# Patient Record
Sex: FEMALE | Race: BLACK OR AFRICAN AMERICAN | NOT HISPANIC OR LATINO | Employment: FULL TIME | URBAN - METROPOLITAN AREA
[De-identification: names, ages, dates, MRNs, and addresses within clinical notes are randomized per-mention and may not be internally consistent; named-entity substitution may affect disease eponyms.]

---

## 2023-03-10 ENCOUNTER — TELEPHONE (OUTPATIENT)
Dept: PRIMARY CARE | Facility: CLINIC | Age: 53
End: 2023-03-10
Payer: COMMERCIAL

## 2023-03-14 DIAGNOSIS — E11.9 TYPE 2 DIABETES MELLITUS WITHOUT COMPLICATION, WITHOUT LONG-TERM CURRENT USE OF INSULIN (MULTI): Primary | ICD-10-CM

## 2023-03-14 RX ORDER — DULAGLUTIDE 1.5 MG/.5ML
1.5 INJECTION, SOLUTION SUBCUTANEOUS
Qty: 4 EACH | Refills: 0 | Status: SHIPPED | OUTPATIENT
Start: 2023-03-14 | End: 2023-04-07

## 2023-03-19 DIAGNOSIS — I10 ESSENTIAL (PRIMARY) HYPERTENSION: ICD-10-CM

## 2023-03-19 RX ORDER — HYDROCHLOROTHIAZIDE 25 MG/1
TABLET ORAL
Qty: 90 TABLET | Refills: 0 | Status: SHIPPED | OUTPATIENT
Start: 2023-03-19 | End: 2023-05-22 | Stop reason: ALTCHOICE

## 2023-03-21 DIAGNOSIS — I10 HYPERTENSION, UNSPECIFIED TYPE: Primary | ICD-10-CM

## 2023-03-21 RX ORDER — LOSARTAN POTASSIUM AND HYDROCHLOROTHIAZIDE 12.5; 5 MG/1; MG/1
1 TABLET ORAL DAILY
Qty: 90 TABLET | Refills: 0 | Status: SHIPPED | OUTPATIENT
Start: 2023-03-21 | End: 2023-05-22 | Stop reason: SDUPTHER

## 2023-03-21 RX ORDER — LOSARTAN POTASSIUM AND HYDROCHLOROTHIAZIDE 12.5; 5 MG/1; MG/1
1 TABLET ORAL DAILY
COMMUNITY
End: 2023-03-21 | Stop reason: SDUPTHER

## 2023-03-23 PROBLEM — R51.9 HEADACHE: Status: ACTIVE | Noted: 2023-03-23

## 2023-03-23 PROBLEM — R93.89 ABNORMAL CT OF THE CHEST: Status: ACTIVE | Noted: 2023-03-23

## 2023-03-23 PROBLEM — J30.9 ALLERGIC RHINITIS: Status: ACTIVE | Noted: 2023-03-23

## 2023-03-23 PROBLEM — I10 ESSENTIAL HYPERTENSION: Status: ACTIVE | Noted: 2023-03-23

## 2023-03-23 PROBLEM — N92.0 MENORRHAGIA WITH REGULAR CYCLE: Status: ACTIVE | Noted: 2023-03-23

## 2023-03-23 PROBLEM — M25.561 ARTHRALGIA OF RIGHT KNEE: Status: ACTIVE | Noted: 2023-03-23

## 2023-03-23 PROBLEM — T83.32XA IUD THREADS LOST: Status: ACTIVE | Noted: 2023-03-23

## 2023-03-23 PROBLEM — G43.909 HEADACHE, MIGRAINE: Status: ACTIVE | Noted: 2023-03-23

## 2023-03-23 PROBLEM — N95.1 PERIMENOPAUSAL: Status: ACTIVE | Noted: 2023-03-23

## 2023-03-23 PROBLEM — R63.5 WEIGHT GAIN: Status: ACTIVE | Noted: 2023-03-23

## 2023-03-23 PROBLEM — N39.3 URINARY, INCONTINENCE, STRESS FEMALE: Status: ACTIVE | Noted: 2023-03-23

## 2023-03-23 PROBLEM — N95.1 MENOPAUSE SYNDROME: Status: ACTIVE | Noted: 2023-03-23

## 2023-03-23 PROBLEM — E11.9 DIABETES (MULTI): Status: ACTIVE | Noted: 2023-03-23

## 2023-03-23 PROBLEM — R92.8 ABNORMAL MAMMOGRAM: Status: ACTIVE | Noted: 2023-03-23

## 2023-03-23 PROBLEM — F41.9 ANXIETY: Status: ACTIVE | Noted: 2023-03-23

## 2023-03-23 PROBLEM — N39.0 RECURRENT URINARY TRACT INFECTION: Status: ACTIVE | Noted: 2023-03-23

## 2023-03-23 PROBLEM — R42 DIZZINESS: Status: ACTIVE | Noted: 2023-03-23

## 2023-03-23 PROBLEM — H91.90 HEARING LOSS: Status: ACTIVE | Noted: 2023-03-23

## 2023-03-23 PROBLEM — J30.0 VASOMOTOR RHINITIS: Status: ACTIVE | Noted: 2023-03-23

## 2023-03-23 PROBLEM — R35.0 URINARY FREQUENCY: Status: ACTIVE | Noted: 2023-03-23

## 2023-03-23 PROBLEM — G47.33 OBSTRUCTIVE SLEEP APNEA: Status: ACTIVE | Noted: 2023-03-23

## 2023-03-23 PROBLEM — E78.2 HYPERLIPIDEMIA, MIXED: Status: ACTIVE | Noted: 2023-03-23

## 2023-03-23 PROBLEM — E66.812 CLASS 2 SEVERE OBESITY WITH SERIOUS COMORBIDITY AND BODY MASS INDEX (BMI) OF 36.0 TO 36.9 IN ADULT: Status: ACTIVE | Noted: 2023-03-23

## 2023-03-23 PROBLEM — H02.401 PTOSIS, RIGHT: Status: ACTIVE | Noted: 2023-03-23

## 2023-03-23 PROBLEM — R80.9 PROTEINURIA: Status: ACTIVE | Noted: 2023-03-23

## 2023-03-23 PROBLEM — R73.01 IMPAIRED FASTING GLUCOSE: Status: ACTIVE | Noted: 2023-03-23

## 2023-03-23 PROBLEM — E66.01 CLASS 2 SEVERE OBESITY WITH SERIOUS COMORBIDITY AND BODY MASS INDEX (BMI) OF 36.0 TO 36.9 IN ADULT (MULTI): Status: ACTIVE | Noted: 2023-03-23

## 2023-03-23 PROBLEM — E55.9 VITAMIN D DEFICIENCY: Status: ACTIVE | Noted: 2023-03-23

## 2023-03-23 PROBLEM — G47.19 EXCESSIVE DAYTIME SLEEPINESS: Status: ACTIVE | Noted: 2023-03-23

## 2023-03-23 PROBLEM — R06.81 WITNESSED APNEIC SPELLS: Status: ACTIVE | Noted: 2023-03-23

## 2023-03-23 PROBLEM — K59.09 CHRONIC CONSTIPATION: Status: ACTIVE | Noted: 2023-03-23

## 2023-03-23 PROBLEM — D21.9 FIBROIDS: Status: ACTIVE | Noted: 2023-03-23

## 2023-03-23 PROBLEM — R59.0 CERVICAL LYMPHADENOPATHY: Status: ACTIVE | Noted: 2023-03-23

## 2023-03-23 PROBLEM — R39.15 URGENCY OF URINATION: Status: ACTIVE | Noted: 2023-03-23

## 2023-03-23 PROBLEM — R01.1 HEART MURMUR: Status: ACTIVE | Noted: 2023-03-23

## 2023-03-23 PROBLEM — R63.4 WEIGHT LOSS: Status: ACTIVE | Noted: 2023-03-23

## 2023-03-23 PROBLEM — F52.0 HYPOACTIVE SEXUAL DESIRE: Status: ACTIVE | Noted: 2023-03-23

## 2023-03-23 PROBLEM — D64.9 ANEMIA: Status: ACTIVE | Noted: 2023-03-23

## 2023-03-23 RX ORDER — ESTRADIOL 0.1 MG/G
CREAM VAGINAL NIGHTLY
COMMUNITY
Start: 2022-01-25 | End: 2024-01-02 | Stop reason: SDUPTHER

## 2023-03-23 RX ORDER — METOPROLOL TARTRATE 25 MG/1
25 TABLET, FILM COATED ORAL 2 TIMES DAILY
COMMUNITY
Start: 2019-04-06 | End: 2023-05-22 | Stop reason: SDUPTHER

## 2023-03-23 RX ORDER — AZELASTINE 1 MG/ML
2 SPRAY, METERED NASAL 2 TIMES DAILY
COMMUNITY
Start: 2022-11-01

## 2023-03-23 RX ORDER — TIRZEPATIDE 10 MG/.5ML
10 INJECTION, SOLUTION SUBCUTANEOUS
COMMUNITY
End: 2023-05-22 | Stop reason: ALTCHOICE

## 2023-03-23 RX ORDER — ATORVASTATIN CALCIUM 20 MG/1
20 TABLET, FILM COATED ORAL DAILY
COMMUNITY
Start: 2018-12-28 | End: 2023-05-22 | Stop reason: SDUPTHER

## 2023-03-23 RX ORDER — AMLODIPINE BESYLATE 5 MG/1
5 TABLET ORAL DAILY
COMMUNITY
Start: 2019-04-06 | End: 2023-05-22 | Stop reason: SDUPTHER

## 2023-03-23 RX ORDER — SOLIFENACIN SUCCINATE 5 MG/1
5 TABLET, FILM COATED ORAL DAILY
COMMUNITY
Start: 2022-11-30 | End: 2023-05-22 | Stop reason: SDUPTHER

## 2023-03-23 RX ORDER — CHOLECALCIFEROL (VITAMIN D3) 25 MCG
25 TABLET ORAL DAILY
COMMUNITY
Start: 2015-08-12

## 2023-03-28 ENCOUNTER — TELEMEDICINE (OUTPATIENT)
Dept: PRIMARY CARE | Facility: CLINIC | Age: 53
End: 2023-03-28
Payer: COMMERCIAL

## 2023-03-28 DIAGNOSIS — I10 HYPERTENSION, UNSPECIFIED TYPE: Primary | ICD-10-CM

## 2023-03-28 DIAGNOSIS — E55.9 VITAMIN D DEFICIENCY: ICD-10-CM

## 2023-03-28 DIAGNOSIS — E11.9 TYPE 2 DIABETES MELLITUS WITHOUT COMPLICATION, WITHOUT LONG-TERM CURRENT USE OF INSULIN (MULTI): ICD-10-CM

## 2023-03-28 PROCEDURE — 99213 OFFICE O/P EST LOW 20 MIN: CPT | Performed by: INTERNAL MEDICINE

## 2023-03-28 NOTE — PROGRESS NOTES
Subjective   Patient ID: Javier Rascon is a 53 y.o. female who presents for Follow-up.  HPI    Review of Systems    Objective   Physical Exam  An interactive audio and video telecommunication system which permits real time communications between the patient (at the originating site) and provider (at the distant site) was utilized to provide this telehealth service.    Verbal consent was requested and obtained from the patient on the day of encounter.    Assessment/Plan   Problem List Items Addressed This Visit          Endocrine/Metabolic    Diabetes (CMS/[facility])    Vitamin D deficiency     Other Visit Diagnoses       Hypertension, unspecified type    -  Primary        The patient is here for a follow up on chronic medical problems.  His medications were reviewed, pharmacy updated, notes reviewed, prior labs reviewed.     HTN.  Well controlled.  Continue with current medications.  Check BP at home daily.  Report to us if the numbers are higher than 130/80.     Diabetes Mellitus type II, under good  control.  1. Rx changes none  2. Education: Reviewed ‘ABCs’ of diabetes management (respective goals in parentheses):  A1C (<7), blood pressure (<130/80), and cholesterol (LDL <100).  3. Compliance at present is estimated to be good. Efforts to improve compliance were discussed.  4. Follow up in 3 months    By optimizing your health through good nutrition, daily exercise, stress management, low/moderate alcohol and avoidance of tobacco, sugar and processed foods, you can help to decrease your risk of cardiovascular disease and stroke and achieve a healthy weight. A diet rich in whole, plant-based foods such as vegetables, beans, seeds, nuts, whole grains, healthy fats and fruit - leads to lower body mass index, blood pressure, HbA1C, and cholesterol levels.              Carri Lemons MD

## 2023-04-07 DIAGNOSIS — E11.9 TYPE 2 DIABETES MELLITUS WITHOUT COMPLICATION, WITHOUT LONG-TERM CURRENT USE OF INSULIN (MULTI): ICD-10-CM

## 2023-04-07 RX ORDER — DULAGLUTIDE 1.5 MG/.5ML
1.5 INJECTION, SOLUTION SUBCUTANEOUS
Qty: 4 EACH | Refills: 0 | Status: SHIPPED | OUTPATIENT
Start: 2023-04-07 | End: 2023-05-22 | Stop reason: ALTCHOICE

## 2023-04-20 LAB
APPEARANCE, URINE: ABNORMAL
BILIRUBIN, URINE: NEGATIVE
BLOOD, URINE: ABNORMAL
COLOR, URINE: ABNORMAL
GLUCOSE, URINE: NEGATIVE MG/DL
KETONES, URINE: ABNORMAL MG/DL
LEUKOCYTE ESTERASE, URINE: NEGATIVE
MUCUS, URINE: NORMAL /LPF
NITRITE, URINE: NEGATIVE
PH, URINE: 5 (ref 5–8)
PROTEIN, URINE: ABNORMAL MG/DL
RBC, URINE: 1 /HPF (ref 0–5)
SPECIFIC GRAVITY, URINE: 1.03 (ref 1–1.03)
SQUAMOUS EPITHELIAL CELLS, URINE: 3 /HPF
UROBILINOGEN, URINE: <2 MG/DL (ref 0–1.9)
WBC, URINE: <1 /HPF (ref 0–5)

## 2023-04-21 LAB — URINE CULTURE: NORMAL

## 2023-05-22 ENCOUNTER — TELEMEDICINE (OUTPATIENT)
Dept: PRIMARY CARE | Facility: CLINIC | Age: 53
End: 2023-05-22
Payer: COMMERCIAL

## 2023-05-22 DIAGNOSIS — E55.9 VITAMIN D DEFICIENCY: ICD-10-CM

## 2023-05-22 DIAGNOSIS — G47.19 EXCESSIVE DAYTIME SLEEPINESS: Primary | ICD-10-CM

## 2023-05-22 DIAGNOSIS — I10 ESSENTIAL HYPERTENSION: ICD-10-CM

## 2023-05-22 DIAGNOSIS — I10 HYPERTENSION, UNSPECIFIED TYPE: ICD-10-CM

## 2023-05-22 DIAGNOSIS — E78.2 HYPERLIPIDEMIA, MIXED: ICD-10-CM

## 2023-05-22 DIAGNOSIS — E11.9 TYPE 2 DIABETES MELLITUS WITHOUT COMPLICATION, WITHOUT LONG-TERM CURRENT USE OF INSULIN (MULTI): ICD-10-CM

## 2023-05-22 DIAGNOSIS — G47.33 OBSTRUCTIVE SLEEP APNEA: ICD-10-CM

## 2023-05-22 PROCEDURE — 99214 OFFICE O/P EST MOD 30 MIN: CPT | Performed by: INTERNAL MEDICINE

## 2023-05-22 RX ORDER — METOPROLOL TARTRATE 25 MG/1
TABLET, FILM COATED ORAL
Qty: 180 TABLET | Refills: 0 | Status: SHIPPED | OUTPATIENT
Start: 2023-05-22 | End: 2023-09-08 | Stop reason: SDUPTHER

## 2023-05-22 RX ORDER — SOLIFENACIN SUCCINATE 5 MG/1
5 TABLET, FILM COATED ORAL DAILY
Qty: 90 TABLET | Refills: 0 | Status: SHIPPED | OUTPATIENT
Start: 2023-05-22 | End: 2023-08-16

## 2023-05-22 RX ORDER — METOPROLOL TARTRATE 25 MG/1
25 TABLET, FILM COATED ORAL 2 TIMES DAILY
Qty: 90 TABLET | Refills: 0 | Status: SHIPPED | OUTPATIENT
Start: 2023-05-22 | End: 2023-05-22

## 2023-05-22 RX ORDER — AMLODIPINE BESYLATE 5 MG/1
5 TABLET ORAL DAILY
Qty: 90 TABLET | Refills: 0 | Status: SHIPPED | OUTPATIENT
Start: 2023-05-22 | End: 2023-09-08 | Stop reason: SDUPTHER

## 2023-05-22 RX ORDER — LOSARTAN POTASSIUM AND HYDROCHLOROTHIAZIDE 12.5; 5 MG/1; MG/1
1 TABLET ORAL DAILY
Qty: 90 TABLET | Refills: 0 | Status: SHIPPED | OUTPATIENT
Start: 2023-05-22 | End: 2023-09-08 | Stop reason: SDUPTHER

## 2023-05-22 RX ORDER — ATORVASTATIN CALCIUM 20 MG/1
20 TABLET, FILM COATED ORAL DAILY
Qty: 90 TABLET | Refills: 0 | Status: SHIPPED | OUTPATIENT
Start: 2023-05-22 | End: 2023-09-08 | Stop reason: SDUPTHER

## 2023-05-22 ASSESSMENT — ENCOUNTER SYMPTOMS
RESPIRATORY NEGATIVE: 1
CARDIOVASCULAR NEGATIVE: 1
CONSTITUTIONAL NEGATIVE: 1
GASTROINTESTINAL NEGATIVE: 1

## 2023-05-22 NOTE — PROGRESS NOTES
Subjective   Patient ID: Javier Rascon is a 53 y.o. female who presents for Follow-up.  HPI    Review of Systems   Constitutional: Negative.    Respiratory: Negative.     Cardiovascular: Negative.    Gastrointestinal: Negative.        Objective   Physical Exam  Neurological:      Mental Status: She is alert.   Psychiatric:         Mood and Affect: Mood normal.         Assessment/Plan   Problem List Items Addressed This Visit          Nervous    Excessive daytime sleepiness - Primary    Obstructive sleep apnea       Circulatory    Essential hypertension    Relevant Medications    amLODIPine (Norvasc) 5 mg tablet    atorvastatin (Lipitor) 20 mg tablet    metoprolol tartrate (Lopressor) 25 mg tablet    solifenacin (VESIcare) 5 mg tablet    dulaglutide 3 mg/0.5 mL pen injector       Endocrine/Metabolic    Diabetes (CMS/HCC)    Relevant Medications    amLODIPine (Norvasc) 5 mg tablet    atorvastatin (Lipitor) 20 mg tablet    metoprolol tartrate (Lopressor) 25 mg tablet    solifenacin (VESIcare) 5 mg tablet    dulaglutide 3 mg/0.5 mL pen injector    Other Relevant Orders    CBC    Comprehensive Metabolic Panel    TSH with reflex to Free T4 if abnormal    Hemoglobin A1C    Lipid Panel    Vitamin D, Total    Albumin, urine, random    Vitamin D deficiency    Relevant Orders    CBC    Comprehensive Metabolic Panel    TSH with reflex to Free T4 if abnormal    Hemoglobin A1C    Lipid Panel    Vitamin D, Total    Albumin, urine, random       Other    Hyperlipidemia, mixed    Relevant Medications    amLODIPine (Norvasc) 5 mg tablet    atorvastatin (Lipitor) 20 mg tablet    metoprolol tartrate (Lopressor) 25 mg tablet    solifenacin (VESIcare) 5 mg tablet    dulaglutide 3 mg/0.5 mL pen injector    Other Relevant Orders    CBC    Comprehensive Metabolic Panel    TSH with reflex to Free T4 if abnormal    Hemoglobin A1C    Lipid Panel    Vitamin D, Total    Albumin, urine, random     Other Visit Diagnoses       Hypertension,  unspecified type        Relevant Medications    losartan-hydrochlorothiazide (Hyzaar) 50-12.5 mg tablet    amLODIPine (Norvasc) 5 mg tablet    atorvastatin (Lipitor) 20 mg tablet    metoprolol tartrate (Lopressor) 25 mg tablet    solifenacin (VESIcare) 5 mg tablet    dulaglutide 3 mg/0.5 mL pen injector    Other Relevant Orders    CBC    Comprehensive Metabolic Panel    TSH with reflex to Free T4 if abnormal    Hemoglobin A1C    Lipid Panel    Vitamin D, Total    Albumin, urine, random        An interactive audio and video telecommunication system which permits real time communications between the patient (at the originating site) and provider (at the distant site) was utilized to provide this telehealth service.    Verbal consent was requested and obtained from the patient on the day of encounter.  The patient is here for a follow up on chronic medical problems.  His medications were reviewed, pharmacy updated, notes reviewed, prior labs reviewed.       HTN.  Well controlled.  Continue with current medications.  Check BP at home daily.  Report to us if the numbers are higher than 130/80.     Diabetes Mellitus type II, under good  control.  1. Rx changes none  2. Education: Reviewed ‘ABCs’ of diabetes management (respective goals in parentheses):  A1C (<7), blood pressure (<130/80), and cholesterol (LDL <100).  3. Compliance at present is estimated to be good. Efforts to improve compliance were discussed.  4. Follow up in 3 months    We will increase Trulicity to 3 mg weekly  Mounjaro didn't work well, she was hungry and BG levels were off  She went back on Trulicity        HLD  Stable  Continue with statins  Check lipids    Lab Results   Component Value Date    WBC 6.4 03/05/2023    HGB 12.8 03/05/2023    HCT 38.8 03/05/2023     03/05/2023    CHOL 173 11/30/2022    TRIG 118 11/30/2022    HDL 40.9 11/30/2022    ALT 95 (H) 03/05/2023     (H) 03/05/2023     03/05/2023    K 3.3 (L) 03/05/2023    CL  100 03/05/2023    CREATININE 0.64 03/05/2023    BUN 9 03/05/2023    CO2 30 03/05/2023    TSH 0.66 11/30/2022    HGBA1C 7.3 (A) 11/30/2022     par  MDM  1) COMPLEXITY: MORE THAN 1 STABLE CHRONIC CONDITION ADDRESSED OR 1 ACUTE ILLNESS ADDRESSED   2)DATA: TESTS INTERPRETED AND OR ORDERED, TOOK INDEPENDENT HISTORY OR RECORDS REVIEWED  3)RISK: MODERATE RISK DUE TO NATURE OF MEDICAL CONDITIONS/COMORBIDITY OR MEDICATIONS ORDERED OR SURGICAL OR PROCEDURE REFERRAL           Carri Lemons MD

## 2023-06-05 DIAGNOSIS — J30.9 ALLERGIC RHINITIS, UNSPECIFIED SEASONALITY, UNSPECIFIED TRIGGER: ICD-10-CM

## 2023-06-05 RX ORDER — LEVOCETIRIZINE DIHYDROCHLORIDE 5 MG/1
5 TABLET, FILM COATED ORAL EVERY EVENING
Qty: 90 TABLET | Refills: 0 | Status: SHIPPED | OUTPATIENT
Start: 2023-06-05 | End: 2023-09-13

## 2023-06-23 ENCOUNTER — TELEPHONE (OUTPATIENT)
Dept: PRIMARY CARE | Facility: CLINIC | Age: 53
End: 2023-06-23
Payer: COMMERCIAL

## 2023-08-16 DIAGNOSIS — I10 HYPERTENSION, UNSPECIFIED TYPE: ICD-10-CM

## 2023-08-16 DIAGNOSIS — I10 ESSENTIAL HYPERTENSION: ICD-10-CM

## 2023-08-16 DIAGNOSIS — E78.2 HYPERLIPIDEMIA, MIXED: ICD-10-CM

## 2023-08-16 DIAGNOSIS — E11.9 TYPE 2 DIABETES MELLITUS WITHOUT COMPLICATION, WITHOUT LONG-TERM CURRENT USE OF INSULIN (MULTI): ICD-10-CM

## 2023-08-16 RX ORDER — SOLIFENACIN SUCCINATE 5 MG/1
5 TABLET, FILM COATED ORAL DAILY
Qty: 90 TABLET | Refills: 0 | Status: SHIPPED | OUTPATIENT
Start: 2023-08-16 | End: 2024-04-01 | Stop reason: SDUPTHER

## 2023-08-23 ENCOUNTER — APPOINTMENT (OUTPATIENT)
Dept: PRIMARY CARE | Facility: CLINIC | Age: 53
End: 2023-08-23

## 2023-09-08 ENCOUNTER — TELEMEDICINE (OUTPATIENT)
Dept: PRIMARY CARE | Facility: CLINIC | Age: 53
End: 2023-09-08
Payer: MEDICARE

## 2023-09-08 DIAGNOSIS — I10 HYPERTENSION, UNSPECIFIED TYPE: ICD-10-CM

## 2023-09-08 DIAGNOSIS — E11.9 TYPE 2 DIABETES MELLITUS WITHOUT COMPLICATION, WITHOUT LONG-TERM CURRENT USE OF INSULIN (MULTI): Primary | ICD-10-CM

## 2023-09-08 DIAGNOSIS — I10 ESSENTIAL HYPERTENSION: ICD-10-CM

## 2023-09-08 DIAGNOSIS — E66.01 CLASS 2 SEVERE OBESITY DUE TO EXCESS CALORIES WITH SERIOUS COMORBIDITY AND BODY MASS INDEX (BMI) OF 36.0 TO 36.9 IN ADULT (MULTI): ICD-10-CM

## 2023-09-08 DIAGNOSIS — E78.2 HYPERLIPIDEMIA, MIXED: ICD-10-CM

## 2023-09-08 DIAGNOSIS — E11.9 TYPE 2 DIABETES MELLITUS WITHOUT COMPLICATION, WITHOUT LONG-TERM CURRENT USE OF INSULIN (MULTI): ICD-10-CM

## 2023-09-08 PROCEDURE — 99214 OFFICE O/P EST MOD 30 MIN: CPT | Performed by: INTERNAL MEDICINE

## 2023-09-08 RX ORDER — LOSARTAN POTASSIUM AND HYDROCHLOROTHIAZIDE 12.5; 5 MG/1; MG/1
1 TABLET ORAL DAILY
Qty: 90 TABLET | Refills: 0 | Status: SHIPPED | OUTPATIENT
Start: 2023-09-08 | End: 2024-01-02 | Stop reason: SDUPTHER

## 2023-09-08 RX ORDER — TIRZEPATIDE 5 MG/.5ML
5 INJECTION, SOLUTION SUBCUTANEOUS
Qty: 2 ML | Refills: 2 | Status: SHIPPED | OUTPATIENT
Start: 2023-09-08 | End: 2023-09-08 | Stop reason: SDUPTHER

## 2023-09-08 RX ORDER — ATORVASTATIN CALCIUM 20 MG/1
20 TABLET, FILM COATED ORAL DAILY
Qty: 90 TABLET | Refills: 0 | Status: SHIPPED | OUTPATIENT
Start: 2023-09-08 | End: 2024-01-02 | Stop reason: SDUPTHER

## 2023-09-08 RX ORDER — TIRZEPATIDE 2.5 MG/.5ML
2.5 INJECTION, SOLUTION SUBCUTANEOUS
Qty: 2 ML | Refills: 0 | Status: SHIPPED | OUTPATIENT
Start: 2023-09-08 | End: 2023-09-08 | Stop reason: SDUPTHER

## 2023-09-08 RX ORDER — METOPROLOL TARTRATE 25 MG/1
25 TABLET, FILM COATED ORAL 2 TIMES DAILY
Qty: 180 TABLET | Refills: 0 | Status: SHIPPED | OUTPATIENT
Start: 2023-09-08 | End: 2024-01-02 | Stop reason: SDUPTHER

## 2023-09-08 RX ORDER — AMLODIPINE BESYLATE 5 MG/1
5 TABLET ORAL DAILY
Qty: 90 TABLET | Refills: 0 | Status: SHIPPED | OUTPATIENT
Start: 2023-09-08 | End: 2024-01-02 | Stop reason: SDUPTHER

## 2023-09-08 ASSESSMENT — ENCOUNTER SYMPTOMS
RESPIRATORY NEGATIVE: 1
GASTROINTESTINAL NEGATIVE: 1
CONSTITUTIONAL NEGATIVE: 1
CARDIOVASCULAR NEGATIVE: 1

## 2023-09-08 NOTE — PROGRESS NOTES
Subjective   Patient ID: Javier Rascon is a 53 y.o. female who presents for Follow-up.  HPI    Review of Systems   Constitutional: Negative.    Respiratory: Negative.     Cardiovascular: Negative.    Gastrointestinal: Negative.        Objective   Physical Exam  Neurological:      Mental Status: She is alert.   Psychiatric:         Mood and Affect: Mood normal.         Assessment/Plan   Problem List Items Addressed This Visit       Class 2 severe obesity with serious comorbidity and body mass index (BMI) of 36.0 to 36.9 in adult (CMS/HCC)    Diabetes (CMS/HCC) - Primary    Relevant Medications    tirzepatide (Mounjaro) 2.5 mg/0.5 mL pen injector    tirzepatide (Mounjaro) 5 mg/0.5 mL pen injector    amLODIPine (Norvasc) 5 mg tablet    atorvastatin (Lipitor) 20 mg tablet    metoprolol tartrate (Lopressor) 25 mg tablet    Essential hypertension    Relevant Medications    amLODIPine (Norvasc) 5 mg tablet    atorvastatin (Lipitor) 20 mg tablet    metoprolol tartrate (Lopressor) 25 mg tablet    Hyperlipidemia, mixed    Relevant Medications    amLODIPine (Norvasc) 5 mg tablet    atorvastatin (Lipitor) 20 mg tablet    metoprolol tartrate (Lopressor) 25 mg tablet     Other Visit Diagnoses       Hypertension, unspecified type        Relevant Medications    amLODIPine (Norvasc) 5 mg tablet    atorvastatin (Lipitor) 20 mg tablet    metoprolol tartrate (Lopressor) 25 mg tablet    losartan-hydrochlorothiazide (Hyzaar) 50-12.5 mg tablet          Stopped taking Tulicity 2 months ago.  Would like to go back on Mounjaro, it was curbing her appetite better.  We will send the script.  Due for a physical.  Due for complete BW at that time.  BP has been stable.  Lab Results   Component Value Date    WBC 7.3 07/23/2023    HGB 12.9 07/23/2023    HCT 39.6 07/23/2023     07/23/2023    CHOL 173 11/30/2022    TRIG 118 11/30/2022    HDL 40.9 11/30/2022    ALT 95 (H) 03/05/2023     (H) 03/05/2023     07/23/2023    K 3.3 (L)  "07/23/2023    CL 98 07/23/2023    CREATININE 0.77 07/23/2023    BUN 15 07/23/2023    CO2 34 (H) 07/23/2023    TSH 0.66 11/30/2022    HGBA1C 7.3 (A) 11/30/2022     Par  Lab Results   Component Value Date    CHOL 173 11/30/2022    CHOL 147 05/06/2022    CHOL 163 08/11/2021     Lab Results   Component Value Date    HDL 40.9 11/30/2022    HDL 41.4 05/06/2022    HDL 40.9 08/11/2021     No results found for: \"LDLCALC\"  Lab Results   Component Value Date    TRIG 118 11/30/2022    TRIG 58 05/06/2022    TRIG 93 08/11/2021     No components found for: \"CHOLHDL\"  Lab Results   Component Value Date    HGBA1C 7.3 (A) 11/30/2022     .   MDM  1) COMPLEXITY: MORE THAN 1 STABLE CHRONIC CONDITION ADDRESSED OR 1 ACUTE ILLNESS ADDRESSED   2)DATA: TESTS INTERPRETED AND OR ORDERED, TOOK INDEPENDENT HISTORY OR RECORDS REVIEWED  3)RISK: MODERATE RISK DUE TO NATURE OF MEDICAL CONDITIONS/COMORBIDITY OR MEDICATIONS ORDERED OR SURGICAL OR PROCEDURE REFERRAL    An interactive audio and video telecommunication system which permits real time communications between the patient (at the originating site) and provider (at the distant site) was utilized to provide this telehealth service.    Verbal consent was requested and obtained from the patient on the day of encounter.        Carri Lemons MD   "

## 2023-09-09 RX ORDER — TIRZEPATIDE 5 MG/.5ML
5 INJECTION, SOLUTION SUBCUTANEOUS
Qty: 2 ML | Refills: 2 | Status: SHIPPED | OUTPATIENT
Start: 2023-09-09 | End: 2024-01-02 | Stop reason: ALTCHOICE

## 2023-09-09 RX ORDER — TIRZEPATIDE 2.5 MG/.5ML
2.5 INJECTION, SOLUTION SUBCUTANEOUS
Qty: 2 ML | Refills: 0 | Status: SHIPPED | OUTPATIENT
Start: 2023-09-09 | End: 2024-01-02 | Stop reason: ALTCHOICE

## 2023-09-13 DIAGNOSIS — J30.9 ALLERGIC RHINITIS, UNSPECIFIED SEASONALITY, UNSPECIFIED TRIGGER: ICD-10-CM

## 2023-09-13 RX ORDER — LEVOCETIRIZINE DIHYDROCHLORIDE 5 MG/1
5 TABLET, FILM COATED ORAL EVERY EVENING
Qty: 90 TABLET | Refills: 0 | Status: SHIPPED | OUTPATIENT
Start: 2023-09-13

## 2023-09-14 RX ORDER — CEPHALEXIN 250 MG/1
250 CAPSULE ORAL
COMMUNITY
Start: 2023-05-23 | End: 2024-05-23

## 2023-10-26 ENCOUNTER — APPOINTMENT (OUTPATIENT)
Dept: UROLOGY | Facility: CLINIC | Age: 53
End: 2023-10-26
Payer: MEDICARE

## 2023-11-03 ENCOUNTER — APPOINTMENT (OUTPATIENT)
Dept: PRIMARY CARE | Facility: CLINIC | Age: 53
End: 2023-11-03
Payer: MEDICARE

## 2023-12-08 ENCOUNTER — APPOINTMENT (OUTPATIENT)
Dept: PRIMARY CARE | Facility: CLINIC | Age: 53
End: 2023-12-08
Payer: MEDICARE

## 2024-01-02 ENCOUNTER — OFFICE VISIT (OUTPATIENT)
Dept: PRIMARY CARE | Facility: CLINIC | Age: 54
End: 2024-01-02
Payer: MEDICARE

## 2024-01-02 ENCOUNTER — LAB (OUTPATIENT)
Dept: LAB | Facility: LAB | Age: 54
End: 2024-01-02
Payer: MEDICARE

## 2024-01-02 VITALS — WEIGHT: 218 LBS | SYSTOLIC BLOOD PRESSURE: 110 MMHG | DIASTOLIC BLOOD PRESSURE: 80 MMHG | BODY MASS INDEX: 37.42 KG/M2

## 2024-01-02 DIAGNOSIS — E78.2 HYPERLIPIDEMIA, MIXED: ICD-10-CM

## 2024-01-02 DIAGNOSIS — Z00.00 ANNUAL PHYSICAL EXAM: ICD-10-CM

## 2024-01-02 DIAGNOSIS — I10 HYPERTENSION, UNSPECIFIED TYPE: ICD-10-CM

## 2024-01-02 DIAGNOSIS — I10 ESSENTIAL HYPERTENSION: ICD-10-CM

## 2024-01-02 DIAGNOSIS — E11.9 TYPE 2 DIABETES MELLITUS WITHOUT COMPLICATION, WITHOUT LONG-TERM CURRENT USE OF INSULIN (MULTI): ICD-10-CM

## 2024-01-02 DIAGNOSIS — Z00.00 ANNUAL PHYSICAL EXAM: Primary | ICD-10-CM

## 2024-01-02 LAB
25(OH)D3 SERPL-MCNC: 78 NG/ML (ref 30–100)
ALBUMIN SERPL BCP-MCNC: 4.8 G/DL (ref 3.4–5)
ALP SERPL-CCNC: 128 U/L (ref 33–110)
ALT SERPL W P-5'-P-CCNC: 25 U/L (ref 7–45)
ANION GAP SERPL CALC-SCNC: 13 MMOL/L (ref 10–20)
AST SERPL W P-5'-P-CCNC: 21 U/L (ref 9–39)
BILIRUB SERPL-MCNC: 1.2 MG/DL (ref 0–1.2)
BUN SERPL-MCNC: 8 MG/DL (ref 6–23)
CALCIUM SERPL-MCNC: 10.4 MG/DL (ref 8.6–10.6)
CHLORIDE SERPL-SCNC: 101 MMOL/L (ref 98–107)
CHOLEST SERPL-MCNC: 131 MG/DL (ref 0–199)
CHOLESTEROL/HDL RATIO: 2.9
CO2 SERPL-SCNC: 31 MMOL/L (ref 21–32)
CREAT SERPL-MCNC: 0.68 MG/DL (ref 0.5–1.05)
CREAT UR-MCNC: 189.8 MG/DL (ref 20–320)
ERYTHROCYTE [DISTWIDTH] IN BLOOD BY AUTOMATED COUNT: 14.2 % (ref 11.5–14.5)
EST. AVERAGE GLUCOSE BLD GHB EST-MCNC: 134 MG/DL
FSH SERPL-ACNC: 40.5 IU/L
GFR SERPL CREATININE-BSD FRML MDRD: >90 ML/MIN/1.73M*2
GLUCOSE SERPL-MCNC: 102 MG/DL (ref 74–99)
HBA1C MFR BLD: 6.3 %
HCT VFR BLD AUTO: 40.3 % (ref 36–46)
HCV AB SER QL: NONREACTIVE
HDLC SERPL-MCNC: 44.9 MG/DL
HGB BLD-MCNC: 13.2 G/DL (ref 12–16)
HIV 1+2 AB+HIV1 P24 AG SERPL QL IA: NONREACTIVE
LDLC SERPL CALC-MCNC: 73 MG/DL
MCH RBC QN AUTO: 28.8 PG (ref 26–34)
MCHC RBC AUTO-ENTMCNC: 32.8 G/DL (ref 32–36)
MCV RBC AUTO: 88 FL (ref 80–100)
MICROALBUMIN UR-MCNC: 11.8 MG/L
MICROALBUMIN/CREAT UR: 6.2 UG/MG CREAT
NON HDL CHOLESTEROL: 86 MG/DL (ref 0–149)
NRBC BLD-RTO: 0 /100 WBCS (ref 0–0)
PLATELET # BLD AUTO: 402 X10*3/UL (ref 150–450)
POTASSIUM SERPL-SCNC: 3.7 MMOL/L (ref 3.5–5.3)
PROT SERPL-MCNC: 7.4 G/DL (ref 6.4–8.2)
RBC # BLD AUTO: 4.59 X10*6/UL (ref 4–5.2)
SODIUM SERPL-SCNC: 141 MMOL/L (ref 136–145)
TRIGL SERPL-MCNC: 68 MG/DL (ref 0–149)
TSH SERPL-ACNC: 1.07 MIU/L (ref 0.44–3.98)
VLDL: 14 MG/DL (ref 0–40)
WBC # BLD AUTO: 5.6 X10*3/UL (ref 4.4–11.3)

## 2024-01-02 PROCEDURE — 93000 ELECTROCARDIOGRAM COMPLETE: CPT | Performed by: INTERNAL MEDICINE

## 2024-01-02 PROCEDURE — 84443 ASSAY THYROID STIM HORMONE: CPT

## 2024-01-02 PROCEDURE — 82306 VITAMIN D 25 HYDROXY: CPT

## 2024-01-02 PROCEDURE — 82043 UR ALBUMIN QUANTITATIVE: CPT

## 2024-01-02 PROCEDURE — 1036F TOBACCO NON-USER: CPT | Performed by: INTERNAL MEDICINE

## 2024-01-02 PROCEDURE — 87389 HIV-1 AG W/HIV-1&-2 AB AG IA: CPT

## 2024-01-02 PROCEDURE — 99214 OFFICE O/P EST MOD 30 MIN: CPT | Performed by: INTERNAL MEDICINE

## 2024-01-02 PROCEDURE — 83001 ASSAY OF GONADOTROPIN (FSH): CPT

## 2024-01-02 PROCEDURE — 3008F BODY MASS INDEX DOCD: CPT | Performed by: INTERNAL MEDICINE

## 2024-01-02 PROCEDURE — 36415 COLL VENOUS BLD VENIPUNCTURE: CPT

## 2024-01-02 PROCEDURE — 83036 HEMOGLOBIN GLYCOSYLATED A1C: CPT

## 2024-01-02 PROCEDURE — 3079F DIAST BP 80-89 MM HG: CPT | Performed by: INTERNAL MEDICINE

## 2024-01-02 PROCEDURE — 80061 LIPID PANEL: CPT

## 2024-01-02 PROCEDURE — 86803 HEPATITIS C AB TEST: CPT

## 2024-01-02 PROCEDURE — 80053 COMPREHEN METABOLIC PANEL: CPT

## 2024-01-02 PROCEDURE — 82570 ASSAY OF URINE CREATININE: CPT

## 2024-01-02 PROCEDURE — 3074F SYST BP LT 130 MM HG: CPT | Performed by: INTERNAL MEDICINE

## 2024-01-02 PROCEDURE — 99396 PREV VISIT EST AGE 40-64: CPT | Performed by: INTERNAL MEDICINE

## 2024-01-02 PROCEDURE — 85027 COMPLETE CBC AUTOMATED: CPT

## 2024-01-02 RX ORDER — LOSARTAN POTASSIUM AND HYDROCHLOROTHIAZIDE 12.5; 5 MG/1; MG/1
1 TABLET ORAL DAILY
Qty: 90 TABLET | Refills: 0 | Status: SHIPPED | OUTPATIENT
Start: 2024-01-02 | End: 2024-04-01 | Stop reason: SDUPTHER

## 2024-01-02 RX ORDER — ATORVASTATIN CALCIUM 20 MG/1
20 TABLET, FILM COATED ORAL DAILY
Qty: 90 TABLET | Refills: 0 | Status: SHIPPED | OUTPATIENT
Start: 2024-01-02 | End: 2024-04-01 | Stop reason: SDUPTHER

## 2024-01-02 RX ORDER — ESTRADIOL 0.1 MG/G
2 CREAM VAGINAL NIGHTLY
Qty: 42.5 G | Refills: 0 | Status: SHIPPED | OUTPATIENT
Start: 2024-01-02 | End: 2024-04-01 | Stop reason: SDUPTHER

## 2024-01-02 RX ORDER — AMLODIPINE BESYLATE 5 MG/1
5 TABLET ORAL DAILY
Qty: 90 TABLET | Refills: 0 | Status: SHIPPED | OUTPATIENT
Start: 2024-01-02 | End: 2024-04-01 | Stop reason: SDUPTHER

## 2024-01-02 RX ORDER — METOPROLOL TARTRATE 25 MG/1
25 TABLET, FILM COATED ORAL 2 TIMES DAILY
Qty: 180 TABLET | Refills: 0 | Status: SHIPPED | OUTPATIENT
Start: 2024-01-02 | End: 2024-04-01 | Stop reason: SDUPTHER

## 2024-01-02 RX ORDER — TIRZEPATIDE 7.5 MG/.5ML
7.5 INJECTION, SOLUTION SUBCUTANEOUS
Qty: 2 ML | Refills: 2 | Status: SHIPPED | OUTPATIENT
Start: 2024-01-02 | End: 2024-03-01 | Stop reason: ALTCHOICE

## 2024-01-02 ASSESSMENT — ENCOUNTER SYMPTOMS
RESPIRATORY NEGATIVE: 1
CARDIOVASCULAR NEGATIVE: 1
GASTROINTESTINAL NEGATIVE: 1
CONSTITUTIONAL NEGATIVE: 1

## 2024-01-02 ASSESSMENT — PATIENT HEALTH QUESTIONNAIRE - PHQ9
2. FEELING DOWN, DEPRESSED OR HOPELESS: NOT AT ALL
SUM OF ALL RESPONSES TO PHQ9 QUESTIONS 1 AND 2: 0
1. LITTLE INTEREST OR PLEASURE IN DOING THINGS: NOT AT ALL

## 2024-01-02 NOTE — PROGRESS NOTES
Subjective   Patient ID: Javier Rascon is a 53 y.o. female who presents for Annual Exam and Med Refill.  Med Refill        Review of Systems   Constitutional: Negative.    Respiratory: Negative.     Cardiovascular: Negative.    Gastrointestinal: Negative.        Objective   Physical Exam  Vitals and nursing note reviewed.   Constitutional:       Appearance: Normal appearance.   Cardiovascular:      Rate and Rhythm: Normal rate and regular rhythm.      Pulses: Normal pulses.      Heart sounds: Normal heart sounds.   Pulmonary:      Breath sounds: Normal breath sounds.   Abdominal:      General: Bowel sounds are normal.      Palpations: Abdomen is soft.   Musculoskeletal:         General: Normal range of motion.   Skin:     General: Skin is warm.   Neurological:      General: No focal deficit present.      Mental Status: She is alert.   Psychiatric:         Mood and Affect: Mood normal.         Behavior: Behavior normal.         Assessment/Plan   Problem List Items Addressed This Visit       Diabetes (CMS/HCC)    Relevant Medications    amLODIPine (Norvasc) 5 mg tablet    atorvastatin (Lipitor) 20 mg tablet    metoprolol tartrate (Lopressor) 25 mg tablet    tirzepatide (Mounjaro) 7.5 mg/0.5 mL pen injector    Other Relevant Orders    CBC    Comprehensive Metabolic Panel    TSH with reflex to Free T4 if abnormal    Hemoglobin A1C    Lipid Panel    Vitamin D 25-Hydroxy,Total (for eval of Vitamin D levels)    Albumin, urine, random    HIV 1/2 Antigen/Antibody Screen with Reflex to Confirmation    FSH    Hepatitis C antibody    BI mammo bilateral screening tomosynthesis    Referral to Ophthalmology    Referral to Obstetrics / Gynecology    Essential hypertension    Relevant Medications    amLODIPine (Norvasc) 5 mg tablet    atorvastatin (Lipitor) 20 mg tablet    metoprolol tartrate (Lopressor) 25 mg tablet    Other Relevant Orders    CBC    Comprehensive Metabolic Panel    TSH with reflex to Free T4 if abnormal     Hemoglobin A1C    Lipid Panel    Vitamin D 25-Hydroxy,Total (for eval of Vitamin D levels)    Albumin, urine, random    HIV 1/2 Antigen/Antibody Screen with Reflex to Confirmation    FSH    Hepatitis C antibody    BI mammo bilateral screening tomosynthesis    Referral to Ophthalmology    Referral to Obstetrics / Gynecology    Hyperlipidemia, mixed    Relevant Medications    amLODIPine (Norvasc) 5 mg tablet    atorvastatin (Lipitor) 20 mg tablet    metoprolol tartrate (Lopressor) 25 mg tablet    Other Relevant Orders    CBC    Comprehensive Metabolic Panel    TSH with reflex to Free T4 if abnormal    Hemoglobin A1C    Lipid Panel    Vitamin D 25-Hydroxy,Total (for eval of Vitamin D levels)    Albumin, urine, random    HIV 1/2 Antigen/Antibody Screen with Reflex to Confirmation    FSH    Hepatitis C antibody    BI mammo bilateral screening tomosynthesis    Referral to Ophthalmology    Referral to Obstetrics / Gynecology     Other Visit Diagnoses       Annual physical exam    -  Primary    Relevant Medications    estradiol (Estrace) 0.01 % (0.1 mg/gram) vaginal cream    Other Relevant Orders    CBC    Comprehensive Metabolic Panel    TSH with reflex to Free T4 if abnormal    Hemoglobin A1C    Lipid Panel    Vitamin D 25-Hydroxy,Total (for eval of Vitamin D levels)    Albumin, urine, random    HIV 1/2 Antigen/Antibody Screen with Reflex to Confirmation    FSH    Hepatitis C antibody    BI mammo bilateral screening tomosynthesis    Referral to Ophthalmology    Referral to Obstetrics / Gynecology    ECG 12 Lead    Hypertension, unspecified type        Relevant Medications    amLODIPine (Norvasc) 5 mg tablet    atorvastatin (Lipitor) 20 mg tablet    losartan-hydrochlorothiazide (Hyzaar) 50-12.5 mg tablet    metoprolol tartrate (Lopressor) 25 mg tablet          The patient is here for a physical.  Exam is normal.  We will obtain BW.  Discussed healthy diet and regular exercise program.    The patient is here for a follow up  on chronic medical problems.  His medications were reviewed, pharmacy updated, notes reviewed, prior labs reviewed.    We will increase the dose of Mounjaro  Refills on meds sent  EKG showed L atrial enlargement  She does have sleep apnea and is wearing CPAP    Due for PAP, mammogram, eye exam    Refused vaccinations           Carri Lemons MD

## 2024-03-01 ENCOUNTER — TELEMEDICINE (OUTPATIENT)
Dept: PRIMARY CARE | Facility: CLINIC | Age: 54
End: 2024-03-01
Payer: COMMERCIAL

## 2024-03-01 VITALS — WEIGHT: 207 LBS | BODY MASS INDEX: 35.53 KG/M2

## 2024-03-01 DIAGNOSIS — E78.2 HYPERLIPIDEMIA, MIXED: ICD-10-CM

## 2024-03-01 DIAGNOSIS — I10 ESSENTIAL HYPERTENSION: Primary | ICD-10-CM

## 2024-03-01 DIAGNOSIS — E11.9 TYPE 2 DIABETES MELLITUS WITHOUT COMPLICATION, WITHOUT LONG-TERM CURRENT USE OF INSULIN (MULTI): ICD-10-CM

## 2024-03-01 PROCEDURE — 3048F LDL-C <100 MG/DL: CPT | Performed by: INTERNAL MEDICINE

## 2024-03-01 PROCEDURE — 3044F HG A1C LEVEL LT 7.0%: CPT | Performed by: INTERNAL MEDICINE

## 2024-03-01 PROCEDURE — 3061F NEG MICROALBUMINURIA REV: CPT | Performed by: INTERNAL MEDICINE

## 2024-03-01 PROCEDURE — 99213 OFFICE O/P EST LOW 20 MIN: CPT | Performed by: INTERNAL MEDICINE

## 2024-03-01 PROCEDURE — 3008F BODY MASS INDEX DOCD: CPT | Performed by: INTERNAL MEDICINE

## 2024-03-01 PROCEDURE — 1036F TOBACCO NON-USER: CPT | Performed by: INTERNAL MEDICINE

## 2024-03-01 RX ORDER — TIRZEPATIDE 7.5 MG/.5ML
7.5 INJECTION, SOLUTION SUBCUTANEOUS
Qty: 2 ML | Refills: 2 | Status: SHIPPED | OUTPATIENT
Start: 2024-03-01 | End: 2024-05-28

## 2024-03-01 NOTE — PROGRESS NOTES
Chief Complaint: No chief complaint on file.     HPI    Javier Rascon is a 53 y.o. year old female who presents to the clinic for virtual follow up.      Past Medical History   Past Medical History:   Diagnosis Date    Body mass index (BMI) 35.0-35.9, adult 2021    Body mass index (BMI) of 35.0 to 35.9    Body mass index (BMI) 37.0-37.9, adult 2020    BMI 37.0-37.9, adult    Personal history of other diseases of the circulatory system     History of hypertension    Personal history of other mental and behavioral disorders 07/10/2018    History of depression      Past Surgical History:   Past Surgical History:   Procedure Laterality Date     SECTION, CLASSIC  10/01/2015     Section    OTHER SURGICAL HISTORY  10/01/2015    Open Fracture Reduction    OTHER SURGICAL HISTORY  2022    Total hysterectomy abdominal     Family History:   Family History   Problem Relation Name Age of Onset    Hypertension Mother      Hypertension Father      Diabetes Father      Heart attack Maternal Grandmother      Diabetes Half-Sister Paternal     Hypertension Half-Sister Maternal     Diabetes Half-Brother Paternal      Social History:   Tobacco Use: Medium Risk (2024)    Patient History     Smoking Tobacco Use: Former     Smokeless Tobacco Use: Never     Passive Exposure: Not on file      Social History     Substance and Sexual Activity   Alcohol Use Not Currently        Allergies:   Allergies   Allergen Reactions    Sulfamethoxazole-Trimethoprim Itching        ROS   Review of Systems     Objective   There were no vitals filed for this visit.   BMI Readings from Last 15 Encounters:   24 35.53 kg/m²   24 37.42 kg/m²   23 37.93 kg/m²   22 37.93 kg/m²   12/15/22 37.83 kg/m²   22 38.00 kg/m²   10/18/22 37.76 kg/m²   22 36.73 kg/m²   22 35.70 kg/m²      93.9 kg (207 lb) (3/1/2024  3:21 PM)      Physical Exam  Physical Exam     Labs:  Lab Results   Component  "Value Date    WBC 5.6 01/02/2024    HGB 13.2 01/02/2024    HCT 40.3 01/02/2024    MCV 88 01/02/2024     01/02/2024     Lab Results   Component Value Date    GLUCOSE 102 (H) 01/02/2024    CALCIUM 10.4 01/02/2024     01/02/2024    K 3.7 01/02/2024    CO2 31 01/02/2024     01/02/2024    BUN 8 01/02/2024    CREATININE 0.68 01/02/2024         No components found for: \"URINE ALBUMIN\"  Lab Results   Component Value Date    HGBA1C 6.3 (H) 01/02/2024      Lab Results   Component Value Date    HGBA1C 6.3 (H) 01/02/2024    HGBA1C 7.3 (A) 11/30/2022    HGBA1C 5.7 (A) 05/06/2022     Lab Results   Component Value Date    TSH 1.07 01/02/2024       Current Medications:  Current Outpatient Medications   Medication Sig Dispense Refill    amLODIPine (Norvasc) 5 mg tablet Take 1 tablet (5 mg) by mouth once daily. 90 tablet 0    atorvastatin (Lipitor) 20 mg tablet Take 1 tablet (20 mg) by mouth once daily. 90 tablet 0    azelastine (Astelin) 137 mcg (0.1 %) nasal spray Administer 2 sprays into each nostril in the morning and 2 sprays before bedtime.      cephalexin (Keflex) 250 mg capsule Take 1 capsule (250 mg) by mouth. AS NEEDED WITH INTERCOURSE FOR UTI PREVENTION      cholecalciferol (Vitamin D-3) 25 MCG (1000 UT) tablet Take 1 tablet (25 mcg) by mouth once daily.      estradiol (Estrace) 0.01 % (0.1 mg/gram) vaginal cream Insert 0.5 Applicatorfuls (2 g) into the vagina once daily at bedtime. Apply a pea-sized amount to vaginal opening every Monday, Wednesday, and Friday evening 42.5 g 0    levocetirizine (Xyzal) 5 mg tablet TAKE 1 TABLET (5 MG) BY MOUTH DAILY IN THE EVENING 90 tablet 0    losartan-hydrochlorothiazide (Hyzaar) 50-12.5 mg tablet Take 1 tablet by mouth once daily. 90 tablet 0    metoprolol tartrate (Lopressor) 25 mg tablet Take 1 tablet (25 mg) by mouth 2 times a day. 180 tablet 0    solifenacin (VESIcare) 5 mg tablet TAKE 1 TABLET BY MOUTH EVERY DAY 90 tablet 0    tirzepatide (Mounjaro) 7.5 mg/0.5 " mL pen injector Inject 7.5 mg under the skin 1 (one) time per week. 2 mL 2     No current facility-administered medications for this visit.       Assessment and Plan  Diagnoses and all orders for this visit:  Essential hypertension (Primary)  Hyperlipidemia, mixed  Type 2 diabetes mellitus without complication, without long-term current use of insulin (CMS/[facility])  -     tirzepatide (Mounjaro) 7.5 mg/0.5 mL pen injector; Inject 7.5 mg under the skin 1 (one) time per week.     DM  Doing much better  Losing weight  The only issue is hair loss  She has been losing hair since started Mounjaro  Most likely due to significant weight loss and possible vitamin deficiency  Start b complex vitamins    HTN  Stable  No changes  in meds    Menopause  Supportive care  MVI and Vitamin D with Calcium        Immunizations:  Immunization History   Administered Date(s) Administered    Pf[employer] Purple Cap SARS-CoV-2 03/16/2021, 04/06/2021, 01/06/2022    Tdap vaccine, age 7 year and older (BOOSTRIX, ADACEL) 10/01/2015, 03/07/2018     Please follow up in 3 months    An interactive audio and video telecommunication system which permits real time communications between the patient (at the originating site) and provider (at the distant site) was utilized to provide this telehealth service.    Verbal consent was requested and obtained from the patient on the day of encounter.  This is a virtual visit using HIPAA compliant video platform. It required patient-provider interaction for the medical decision making as documented.     I have communicated my name and active licensure. The patient's identity and physical location were verified at the time of this visit.   Either the patient or their legal representative has been informed of the risks and benefits of -- and alternatives to -- treatment through a remote evaluation and consents to proceed with the evaluation remotely.

## 2024-03-08 ENCOUNTER — APPOINTMENT (OUTPATIENT)
Dept: OBSTETRICS AND GYNECOLOGY | Facility: CLINIC | Age: 54
End: 2024-03-08
Payer: COMMERCIAL

## 2024-04-01 ENCOUNTER — TELEMEDICINE (OUTPATIENT)
Dept: PRIMARY CARE | Facility: CLINIC | Age: 54
End: 2024-04-01
Payer: COMMERCIAL

## 2024-04-01 DIAGNOSIS — Z00.00 ANNUAL PHYSICAL EXAM: ICD-10-CM

## 2024-04-01 DIAGNOSIS — E11.9 TYPE 2 DIABETES MELLITUS WITHOUT COMPLICATION, WITHOUT LONG-TERM CURRENT USE OF INSULIN (MULTI): ICD-10-CM

## 2024-04-01 DIAGNOSIS — E78.2 HYPERLIPIDEMIA, MIXED: ICD-10-CM

## 2024-04-01 DIAGNOSIS — I10 ESSENTIAL HYPERTENSION: Primary | ICD-10-CM

## 2024-04-01 DIAGNOSIS — I10 HYPERTENSION, UNSPECIFIED TYPE: ICD-10-CM

## 2024-04-01 DIAGNOSIS — R73.01 IMPAIRED FASTING GLUCOSE: ICD-10-CM

## 2024-04-01 PROCEDURE — 3008F BODY MASS INDEX DOCD: CPT | Performed by: INTERNAL MEDICINE

## 2024-04-01 PROCEDURE — 99214 OFFICE O/P EST MOD 30 MIN: CPT | Performed by: INTERNAL MEDICINE

## 2024-04-01 PROCEDURE — 3044F HG A1C LEVEL LT 7.0%: CPT | Performed by: INTERNAL MEDICINE

## 2024-04-01 PROCEDURE — 3048F LDL-C <100 MG/DL: CPT | Performed by: INTERNAL MEDICINE

## 2024-04-01 PROCEDURE — 3061F NEG MICROALBUMINURIA REV: CPT | Performed by: INTERNAL MEDICINE

## 2024-04-01 RX ORDER — AMLODIPINE BESYLATE 5 MG/1
5 TABLET ORAL DAILY
Qty: 90 TABLET | Refills: 0 | Status: SHIPPED | OUTPATIENT
Start: 2024-04-01

## 2024-04-01 RX ORDER — ESTRADIOL 0.1 MG/G
2 CREAM VAGINAL NIGHTLY
Qty: 42.5 G | Refills: 0 | Status: SHIPPED | OUTPATIENT
Start: 2024-04-01 | End: 2024-05-21

## 2024-04-01 RX ORDER — ATORVASTATIN CALCIUM 20 MG/1
20 TABLET, FILM COATED ORAL DAILY
Qty: 90 TABLET | Refills: 0 | Status: SHIPPED | OUTPATIENT
Start: 2024-04-01

## 2024-04-01 RX ORDER — METOPROLOL TARTRATE 25 MG/1
25 TABLET, FILM COATED ORAL 2 TIMES DAILY
Qty: 180 TABLET | Refills: 0 | Status: SHIPPED | OUTPATIENT
Start: 2024-04-01

## 2024-04-01 RX ORDER — SOLIFENACIN SUCCINATE 5 MG/1
5 TABLET, FILM COATED ORAL DAILY
Qty: 90 TABLET | Refills: 0 | Status: SHIPPED | OUTPATIENT
Start: 2024-04-01 | End: 2024-05-31 | Stop reason: ALTCHOICE

## 2024-04-01 RX ORDER — LOSARTAN POTASSIUM AND HYDROCHLOROTHIAZIDE 12.5; 5 MG/1; MG/1
1 TABLET ORAL DAILY
Qty: 90 TABLET | Refills: 0 | Status: SHIPPED | OUTPATIENT
Start: 2024-04-01

## 2024-04-01 ASSESSMENT — ENCOUNTER SYMPTOMS
CONSTITUTIONAL NEGATIVE: 1
RESPIRATORY NEGATIVE: 1
GASTROINTESTINAL NEGATIVE: 1
CARDIOVASCULAR NEGATIVE: 1

## 2024-04-01 NOTE — PROGRESS NOTES
Chief Complaint:   Chief Complaint   Patient presents with    Follow-up      HPI    Javier Rascon is a 54 y.o. year old female who presents to the clinic for follow up.     Past Medical History   Past Medical History:   Diagnosis Date    Body mass index (BMI) 35.0-35.9, adult 2021    Body mass index (BMI) of 35.0 to 35.9    Body mass index (BMI) 37.0-37.9, adult 2020    BMI 37.0-37.9, adult    Personal history of other diseases of the circulatory system     History of hypertension    Personal history of other mental and behavioral disorders 07/10/2018    History of depression      Past Surgical History:   Past Surgical History:   Procedure Laterality Date     SECTION, CLASSIC  10/01/2015     Section    OTHER SURGICAL HISTORY  10/01/2015    Open Fracture Reduction    OTHER SURGICAL HISTORY  2022    Total hysterectomy abdominal     Family History:   Family History   Problem Relation Name Age of Onset    Hypertension Mother      Hypertension Father      Diabetes Father      Heart attack Maternal Grandmother      Diabetes Half-Sister Paternal     Hypertension Half-Sister Maternal     Diabetes Half-Brother Paternal      Social History:   Tobacco Use: Medium Risk (2024)    Patient History     Smoking Tobacco Use: Former     Smokeless Tobacco Use: Never     Passive Exposure: Not on file      Social History     Substance and Sexual Activity   Alcohol Use Not Currently        Allergies:   Allergies   Allergen Reactions    Sulfamethoxazole-Trimethoprim Itching        ROS   Review of Systems   Constitutional: Negative.    Respiratory: Negative.     Cardiovascular: Negative.    Gastrointestinal: Negative.         Objective   There were no vitals filed for this visit.   BMI Readings from Last 15 Encounters:   24 35.53 kg/m²   24 37.42 kg/m²   23 37.93 kg/m²   22 37.93 kg/m²   12/15/22 37.83 kg/m²   22 38.00 kg/m²   10/18/22 37.76 kg/m²   22 36.73 kg/m²  "  02/14/22 35.70 kg/m²      93.9 kg (207 lb) (3/1/2024  3:21 PM)      Physical Exam  Physical Exam  Neurological:      Mental Status: She is alert.   Psychiatric:         Mood and Affect: Mood normal.          Labs:  Lab Results   Component Value Date    WBC 5.6 01/02/2024    HGB 13.2 01/02/2024    HCT 40.3 01/02/2024    MCV 88 01/02/2024     01/02/2024     Lab Results   Component Value Date    GLUCOSE 102 (H) 01/02/2024    CALCIUM 10.4 01/02/2024     01/02/2024    K 3.7 01/02/2024    CO2 31 01/02/2024     01/02/2024    BUN 8 01/02/2024    CREATININE 0.68 01/02/2024         No components found for: \"URINE ALBUMIN\"  Lab Results   Component Value Date    HGBA1C 6.3 (H) 01/02/2024      Lab Results   Component Value Date    HGBA1C 6.3 (H) 01/02/2024    HGBA1C 7.3 (A) 11/30/2022    HGBA1C 5.7 (A) 05/06/2022     Lab Results   Component Value Date    TSH 1.07 01/02/2024       Current Medications:  Current Outpatient Medications   Medication Sig Dispense Refill    amLODIPine (Norvasc) 5 mg tablet Take 1 tablet (5 mg) by mouth once daily. 90 tablet 0    atorvastatin (Lipitor) 20 mg tablet Take 1 tablet (20 mg) by mouth once daily. 90 tablet 0    azelastine (Astelin) 137 mcg (0.1 %) nasal spray Administer 2 sprays into each nostril in the morning and 2 sprays before bedtime.      cephalexin (Keflex) 250 mg capsule Take 1 capsule (250 mg) by mouth. AS NEEDED WITH INTERCOURSE FOR UTI PREVENTION      cholecalciferol (Vitamin D-3) 25 MCG (1000 UT) tablet Take 1 tablet (25 mcg) by mouth once daily.      estradiol (Estrace) 0.01 % (0.1 mg/gram) vaginal cream Insert 0.5 Applicatorfuls (2 g) into the vagina once daily at bedtime. Apply a pea-sized amount to vaginal opening every Monday, Wednesday, and Friday evening 42.5 g 0    levocetirizine (Xyzal) 5 mg tablet TAKE 1 TABLET (5 MG) BY MOUTH DAILY IN THE EVENING 90 tablet 0    losartan-hydrochlorothiazide (Hyzaar) 50-12.5 mg tablet Take 1 tablet by mouth once " daily. 90 tablet 0    metoprolol tartrate (Lopressor) 25 mg tablet Take 1 tablet (25 mg) by mouth 2 times a day. 180 tablet 0    semaglutide 0.25 mg or 0.5 mg (2 mg/3 mL) pen injector Inject 0.25 mg under the skin 1 (one) time per week. 2 mL 0    semaglutide 0.25 mg or 0.5 mg (2 mg/3 mL) pen injector Inject 0.5 mg under the skin 1 (one) time per week. 2 mL 3    solifenacin (VESIcare) 5 mg tablet Take 1 tablet (5 mg) by mouth once daily. 90 tablet 0    tirzepatide (Mounjaro) 7.5 mg/0.5 mL pen injector Inject 7.5 mg under the skin 1 (one) time per week. 2 mL 2     No current facility-administered medications for this visit.       Assessment and Plan  Javier was seen today for follow-up.  Diagnoses and all orders for this visit:  Essential hypertension (Primary)  -     amLODIPine (Norvasc) 5 mg tablet; Take 1 tablet (5 mg) by mouth once daily.  -     atorvastatin (Lipitor) 20 mg tablet; Take 1 tablet (20 mg) by mouth once daily.  -     metoprolol tartrate (Lopressor) 25 mg tablet; Take 1 tablet (25 mg) by mouth 2 times a day.  -     solifenacin (VESIcare) 5 mg tablet; Take 1 tablet (5 mg) by mouth once daily.  Hyperlipidemia, mixed  -     amLODIPine (Norvasc) 5 mg tablet; Take 1 tablet (5 mg) by mouth once daily.  -     atorvastatin (Lipitor) 20 mg tablet; Take 1 tablet (20 mg) by mouth once daily.  -     metoprolol tartrate (Lopressor) 25 mg tablet; Take 1 tablet (25 mg) by mouth 2 times a day.  -     solifenacin (VESIcare) 5 mg tablet; Take 1 tablet (5 mg) by mouth once daily.  Type 2 diabetes mellitus without complication, without long-term current use of insulin (CMS/[facility])  -     semaglutide 0.25 mg or 0.5 mg (2 mg/3 mL) pen injector; Inject 0.25 mg under the skin 1 (one) time per week.  -     semaglutide 0.25 mg or 0.5 mg (2 mg/3 mL) pen injector; Inject 0.5 mg under the skin 1 (one) time per week.  -     amLODIPine (Norvasc) 5 mg tablet; Take 1 tablet (5 mg) by mouth once daily.  -     atorvastatin (Lipitor) 20  mg tablet; Take 1 tablet (20 mg) by mouth once daily.  -     metoprolol tartrate (Lopressor) 25 mg tablet; Take 1 tablet (25 mg) by mouth 2 times a day.  -     solifenacin (VESIcare) 5 mg tablet; Take 1 tablet (5 mg) by mouth once daily.  Impaired fasting glucose  Hypertension, unspecified type  -     amLODIPine (Norvasc) 5 mg tablet; Take 1 tablet (5 mg) by mouth once daily.  -     atorvastatin (Lipitor) 20 mg tablet; Take 1 tablet (20 mg) by mouth once daily.  -     metoprolol tartrate (Lopressor) 25 mg tablet; Take 1 tablet (25 mg) by mouth 2 times a day.  -     solifenacin (VESIcare) 5 mg tablet; Take 1 tablet (5 mg) by mouth once daily.  -     losartan-hydrochlorothiazide (Hyzaar) 50-12.5 mg tablet; Take 1 tablet by mouth once daily.  Annual physical exam  -     estradiol (Estrace) 0.01 % (0.1 mg/gram) vaginal cream; Insert 0.5 Applicatorfuls (2 g) into the vagina once daily at bedtime. Apply a pea-sized amount to vaginal opening every Monday, Wednesday, and Friday evening     The patient is here for a follow up on chronic medical problems.  His medications were reviewed, pharmacy updated, notes reviewed, prior labs reviewed.    Mounjaro not available  We will try Ozempic   If still an issues, stop GLPs and start Metformin 500 mg BID  Follow up in 3 months    Immunizations:  Immunization History   Administered Date(s) Administered    Pfizer Purple Cap SARS-CoV-2 03/16/2021, 04/06/2021, 01/06/2022    Tdap vaccine, age 7 year and older (BOOSTRIX, ADACEL) 10/01/2015, 03/07/2018   An interactive audio and video telecommunication system which permits real time communications between the patient (at the originating site) and provider (at the distant site) was utilized to provide this telehealth service.    Verbal consent was requested and obtained from the patient on the day of encounter.  This is a virtual visit using HIPAA compliant video platform. It required patient-provider interaction for the medical decision  making as documented.     I have communicated my name and active licensure. The patient's identity and physical location were verified at the time of this visit.   Either the patient or their legal representative has been informed of the risks and benefits of -- and alternatives to -- treatment through a remote evaluation and consents to proceed with the evaluation remotely.

## 2024-04-10 DIAGNOSIS — N39.0 URINARY TRACT INFECTION, SITE NOT SPECIFIED: ICD-10-CM

## 2024-05-03 ENCOUNTER — APPOINTMENT (OUTPATIENT)
Dept: OBSTETRICS AND GYNECOLOGY | Facility: CLINIC | Age: 54
End: 2024-05-03
Payer: COMMERCIAL

## 2024-05-21 ENCOUNTER — OFFICE VISIT (OUTPATIENT)
Dept: OBSTETRICS AND GYNECOLOGY | Facility: CLINIC | Age: 54
End: 2024-05-21
Payer: COMMERCIAL

## 2024-05-21 VITALS
WEIGHT: 216.47 LBS | DIASTOLIC BLOOD PRESSURE: 77 MMHG | BODY MASS INDEX: 36.96 KG/M2 | HEIGHT: 64 IN | SYSTOLIC BLOOD PRESSURE: 133 MMHG

## 2024-05-21 DIAGNOSIS — N95.1 MENOPAUSAL SYNDROME: ICD-10-CM

## 2024-05-21 DIAGNOSIS — Z01.419 ENCOUNTER FOR ANNUAL ROUTINE GYNECOLOGICAL EXAMINATION: Primary | ICD-10-CM

## 2024-05-21 PROCEDURE — 3061F NEG MICROALBUMINURIA REV: CPT | Performed by: OBSTETRICS & GYNECOLOGY

## 2024-05-21 PROCEDURE — 3078F DIAST BP <80 MM HG: CPT | Performed by: OBSTETRICS & GYNECOLOGY

## 2024-05-21 PROCEDURE — 3048F LDL-C <100 MG/DL: CPT | Performed by: OBSTETRICS & GYNECOLOGY

## 2024-05-21 PROCEDURE — 3044F HG A1C LEVEL LT 7.0%: CPT | Performed by: OBSTETRICS & GYNECOLOGY

## 2024-05-21 PROCEDURE — 1036F TOBACCO NON-USER: CPT | Performed by: OBSTETRICS & GYNECOLOGY

## 2024-05-21 PROCEDURE — 3075F SYST BP GE 130 - 139MM HG: CPT | Performed by: OBSTETRICS & GYNECOLOGY

## 2024-05-21 PROCEDURE — 3008F BODY MASS INDEX DOCD: CPT | Performed by: OBSTETRICS & GYNECOLOGY

## 2024-05-21 PROCEDURE — 99396 PREV VISIT EST AGE 40-64: CPT | Performed by: OBSTETRICS & GYNECOLOGY

## 2024-05-21 RX ORDER — ESTRADIOL 0.5 MG/1
0.5 TABLET ORAL DAILY
Qty: 30 TABLET | Refills: 11 | Status: SHIPPED | OUTPATIENT
Start: 2024-05-21 | End: 2024-05-31 | Stop reason: WASHOUT

## 2024-05-21 ASSESSMENT — ENCOUNTER SYMPTOMS
HEMATOLOGIC/LYMPHATIC NEGATIVE: 0
NEUROLOGICAL NEGATIVE: 0
RESPIRATORY NEGATIVE: 0
MUSCULOSKELETAL NEGATIVE: 0
ENDOCRINE NEGATIVE: 0
GASTROINTESTINAL NEGATIVE: 0
CONSTITUTIONAL NEGATIVE: 0
EYES NEGATIVE: 0
CARDIOVASCULAR NEGATIVE: 0
ALLERGIC/IMMUNOLOGIC NEGATIVE: 0
PSYCHIATRIC NEGATIVE: 0

## 2024-05-21 ASSESSMENT — PAIN SCALES - GENERAL: PAINLEVEL: 0-NO PAIN

## 2024-05-21 NOTE — PROGRESS NOTES
"Javier Rascon is a 54 y.o. female who is here for a routine exam. PCP = Carri Lemons MD  Patient for annual exam.  Sexually active with steady partner.  STD testing discussed.  Patient was being treated for urinary incontinence but is not and not complaining of any issues at this point seems to have essentially resolved.  Is complaining of hot flashes moodiness weight gain night sweats insomnia    Review of Systems  Complaining of hot flashes night sweats and moodiness insomnia.  All other systems negative    Physical Exam  Constitutional:       Appearance: Normal appearance. She is obese.   Genitourinary:   Breasts:     Breasts are soft.     Right: Normal.      Left: Normal.   HENT:      Head: Normocephalic.      Nose: Nose normal.   Eyes:      Pupils: Pupils are equal, round, and reactive to light.   Cardiovascular:      Rate and Rhythm: Normal rate and regular rhythm.   Pulmonary:      Effort: Pulmonary effort is normal.      Breath sounds: Normal breath sounds.   Abdominal:      General: Abdomen is flat. Bowel sounds are normal.      Palpations: Abdomen is soft.   Musculoskeletal:         General: Normal range of motion.      Cervical back: Normal range of motion and neck supple.   Neurological:      General: No focal deficit present.      Mental Status: She is alert.   Skin:     General: Skin is warm and dry.   Psychiatric:         Mood and Affect: Mood normal.         Behavior: Behavior normal.         Thought Content: Thought content normal.         Judgment: Judgment normal.   Vitals reviewed.     Objective   /77   Ht 1.626 m (5' 4\")   Wt 98.2 kg (216 lb 7.5 oz)   BMI 37.16 kg/m²   OB History          4    Para   3    Term   3            AB   1    Living   3         SAB   1    IAB        Ectopic        Multiple        Live Births   3                GynHx:  Menarche/Menopause:     Social History     Substance and Sexual Activity   Sexual Activity Yes    Partners: Male "    Birth control/protection: None     STIs: none    Substance:   Tobacco Use: Medium Risk (5/21/2024)    Patient History     Smoking Tobacco Use: Former     Smokeless Tobacco Use: Never     Passive Exposure: Not on file      Social History     Substance and Sexual Activity   Drug Use Never      Social History     Substance and Sexual Activity   Alcohol Use Not Currently     Abuse: No  Depression Screen:   No issues of depression    Past med hx and past surg hx reviewed and notable for: Hysterectomy    Assessment/Plan    Unremarkable annual GYN exam.  Patient sexually active with steady partner declining STD testing.  Discussed diet exercise calcium and vitamin D  Patient notes she was diagnosed as menopausal by her primary care physician.  Has had increasing problems with hot flashes night sweats insomnia moodiness weight gain irritability was on vaginal estrogen for probable treatment of pelvic floor issues but has not been using it.  Discontinued.  Patient also notes that her urinary incontinence symptoms have all improved spontaneously does not require treatment at this point.  Discussed starting hormonal therapy.  Patient is status post hysterectomy.  Discussed risks to patient both cardiac cardiovascular cancer issues.  Other side effects also reviewed.  Patient would like to try and see symptoms are interfering with her life.  Will try low prescription estrogen and reassess in 2 to 4 weeks.  Return to office in 1 year or as needed.  Mammogram already ordered.

## 2024-05-23 ENCOUNTER — TELEPHONE (OUTPATIENT)
Dept: PRIMARY CARE | Facility: CLINIC | Age: 54
End: 2024-05-23
Payer: COMMERCIAL

## 2024-05-23 RX ORDER — CEPHALEXIN 250 MG/1
CAPSULE ORAL
Qty: 90 CAPSULE | Refills: 0 | Status: SHIPPED | OUTPATIENT
Start: 2024-05-23 | End: 2024-05-24 | Stop reason: SDUPTHER

## 2024-05-23 NOTE — TELEPHONE ENCOUNTER
Pt called. Requesting something different to take besides Ozempic or Mounjaro, that would be more available

## 2024-05-24 ENCOUNTER — TELEPHONE (OUTPATIENT)
Dept: UROLOGY | Facility: CLINIC | Age: 54
End: 2024-05-24
Payer: COMMERCIAL

## 2024-05-24 DIAGNOSIS — N39.0 URINARY TRACT INFECTION, SITE NOT SPECIFIED: ICD-10-CM

## 2024-05-24 RX ORDER — CEPHALEXIN 250 MG/1
250 CAPSULE ORAL NIGHTLY PRN
Qty: 90 CAPSULE | Refills: 0 | Status: SHIPPED | OUTPATIENT
Start: 2024-05-24 | End: 2024-08-22

## 2024-05-24 NOTE — TELEPHONE ENCOUNTER
Patient left a message on voicemail that the Rx for Cephalexin to take PRN with intercourse that was sent to [facility] ([facility]) was not received by Pharmacy.  Looking in chart it looks like Transmission failed.  Can you please resend Rx?   Thanks!  [facility] # [phone]  Pt# [phone]

## 2024-05-28 ENCOUNTER — TELEMEDICINE (OUTPATIENT)
Dept: PRIMARY CARE | Facility: CLINIC | Age: 54
End: 2024-05-28
Payer: COMMERCIAL

## 2024-05-28 DIAGNOSIS — I10 ESSENTIAL HYPERTENSION: Primary | ICD-10-CM

## 2024-05-28 DIAGNOSIS — E78.2 HYPERLIPIDEMIA, MIXED: ICD-10-CM

## 2024-05-28 DIAGNOSIS — E11.9 TYPE 2 DIABETES MELLITUS WITHOUT COMPLICATION, WITHOUT LONG-TERM CURRENT USE OF INSULIN (MULTI): ICD-10-CM

## 2024-05-28 DIAGNOSIS — E66.01 CLASS 2 SEVERE OBESITY DUE TO EXCESS CALORIES WITH SERIOUS COMORBIDITY AND BODY MASS INDEX (BMI) OF 36.0 TO 36.9 IN ADULT (MULTI): ICD-10-CM

## 2024-05-28 PROCEDURE — 3008F BODY MASS INDEX DOCD: CPT | Performed by: INTERNAL MEDICINE

## 2024-05-28 PROCEDURE — 99214 OFFICE O/P EST MOD 30 MIN: CPT | Performed by: INTERNAL MEDICINE

## 2024-05-28 PROCEDURE — 3044F HG A1C LEVEL LT 7.0%: CPT | Performed by: INTERNAL MEDICINE

## 2024-05-28 PROCEDURE — 3061F NEG MICROALBUMINURIA REV: CPT | Performed by: INTERNAL MEDICINE

## 2024-05-28 PROCEDURE — 3048F LDL-C <100 MG/DL: CPT | Performed by: INTERNAL MEDICINE

## 2024-05-28 RX ORDER — DULAGLUTIDE 0.75 MG/.5ML
1.5 INJECTION, SOLUTION SUBCUTANEOUS
Qty: 2 ML | Refills: 11 | Status: SHIPPED | OUTPATIENT
Start: 2024-06-02

## 2024-05-28 RX ORDER — METFORMIN HYDROCHLORIDE 1000 MG/1
1000 TABLET ORAL
Qty: 100 TABLET | Refills: 3 | Status: SHIPPED | OUTPATIENT
Start: 2024-05-28 | End: 2025-07-02

## 2024-05-28 ASSESSMENT — ENCOUNTER SYMPTOMS
DIZZINESS: 0
HEADACHES: 0
NERVOUS/ANXIOUS: 0
WEAKNESS: 0
SPEECH DIFFICULTY: 0
SWEATS: 0
BLURRED VISION: 0
TREMORS: 0
VISUAL CHANGE: 0
POLYDIPSIA: 0
SEIZURES: 0
BLACKOUTS: 0
CONFUSION: 0
POLYPHAGIA: 0
FATIGUE: 0
HUNGER: 0
WEIGHT LOSS: 0

## 2024-05-28 NOTE — PROGRESS NOTES
I reviewed the resident/fellow's documentation and discussed the patient with the resident/fellow. I agree with the resident/fellow's medical decision making as documented in the note.  As the attending physician, I certify that I personally reviewed the patient's history and personally examined the patient to confirm the physical findings described above, and that I reviewed the relevant imaging studies and available reports. I also discussed the differential diagnosis and all of the proposed management plans with the patient and individuals accompanying the patient to this visit. They had the opportunity to ask questions about the proposed management plans and to have those questions answered.     Carri Lemons MD

## 2024-05-28 NOTE — PROGRESS NOTES
Subjective   Patient ID: Javier Rascon is a 54 y.o. female who presents for No chief complaint on file..    Diabetes  She has type 2 diabetes mellitus. No MedicAlert identification noted. The initial diagnosis of diabetes was made 2 years ago. Pertinent negatives for hypoglycemia include no confusion, dizziness, headaches, hunger, mood changes, nervousness/anxiousness, pallor, seizures, sleepiness, speech difficulty, sweats or tremors. Pertinent negatives for diabetes include no blurred vision, no chest pain, no fatigue, no foot paresthesias, no foot ulcerations, no polydipsia, no polyphagia, no polyuria, no visual change, no weakness and no weight loss. Pertinent negatives for hypoglycemia complications include no blackouts, no hospitalization, no nocturnal hypoglycemia, no required assistance and no required glucagon injection. Symptoms are stable. Pertinent negatives for diabetic complications include no CVA, heart disease, impotence, nephropathy, peripheral neuropathy, PVD or retinopathy. Risk factors for coronary artery disease include hypertension. When asked about current treatments, none were reported. She is compliant with treatment all of the time. Her weight is stable. Meal planning includes avoidance of concentrated sweets. She has not had a previous visit with a dietitian. She participates in exercise intermittently. She does not see a podiatrist.Eye exam is not current.      An interactive audio and video telecommunication system which permits real time communications between the patient (at the originating site) and provider (at the distant site) was utilized to provide this telehealth service.    Verbal consent was requested and obtained from the patient on the day of encounter.  This is a virtual visit using HIPAA compliant video platform. It required patient-provider interaction for the medical decision making as documented.     I have communicated my name and active licensure. The patient's  identity and physical location were verified at the time of this visit.   Either the patient or their legal representative has been informed of the risks and benefits of -- and alternatives to -- treatment through a remote evaluation and consents to proceed with the evaluation remotely.     54yr old  female with PMHx of HTN, HLD, T2DM, who presented today for check up and reevaluation. Presented also complaining of difficulty filling her diabetic medication. Patient used to be on trulicity, however she stated that the added advantage of weight loss was not noticeable on the medication. She was then advanced to mounjaro and most recently ozempic after she started experiencing challenges with her insurance while filling out the medication. Patient stated that she has now been out of her diabetic medication for well over 4 weeks. She presented requesting reevaluation of her medications. She has no new complaints today     Review of Systems   Constitutional:  Negative for fatigue and weight loss.   Eyes:  Negative for blurred vision.   Cardiovascular:  Negative for chest pain.   Endocrine: Negative for polydipsia, polyphagia and polyuria.   Genitourinary:  Negative for impotence.   Skin:  Negative for pallor.   Neurological:  Negative for dizziness, tremors, seizures, speech difficulty, weakness and headaches.   Psychiatric/Behavioral:  Negative for confusion. The patient is not nervous/anxious.        Objective   There were no vitals taken for this visit.    Physical Exam    Assessment/Plan   Problem List Items Addressed This Visit             ICD-10-CM    Class 2 severe obesity with serious comorbidity and body mass index (BMI) of 36.0 to 36.9 in adult (Multi)  -We will discontinue prior GLP1s to include mounjaro and ozempic as insurance will not pay for it  -We will revert to trulicity which was covered by insurance  -Patient will also be started on metformin 1g BID in the event that trulicity is also  not covered, and if trulicity is covered, she may continue on both medications  -Repeat A1c in 3 months   - E66.01, Z68.36    Essential hypertension - Primary  -continue amlodipine 5mg daily   -Advised to increase fluid intake, avoid greasy fried foods, high-fiber diets, judicious exercise recommended towards muscle strengthening and weight reduction, good night sleep recommended.   I10    Hyperlipidemia, mixed  -Continue atorvastatin 20 mg daily. E78.2     Health maintenance  -Patient is up-to-date with colonoscopy (1/20/2023) next due in 10 years  -Up-to-date with labs, labs were reevaluated we will obtain an A1c in 3 months.  -Screening mammo request ordered, yet to be done  -Immunization up-to-date except for COVID-vaccine.

## 2024-05-31 ENCOUNTER — OFFICE VISIT (OUTPATIENT)
Dept: UROLOGY | Facility: CLINIC | Age: 54
End: 2024-05-31
Payer: COMMERCIAL

## 2024-05-31 VITALS
RESPIRATION RATE: 18 BRPM | SYSTOLIC BLOOD PRESSURE: 140 MMHG | DIASTOLIC BLOOD PRESSURE: 88 MMHG | HEART RATE: 79 BPM | BODY MASS INDEX: 37.76 KG/M2 | WEIGHT: 220 LBS

## 2024-05-31 DIAGNOSIS — N95.8 GENITOURINARY SYNDROME OF MENOPAUSE: ICD-10-CM

## 2024-05-31 DIAGNOSIS — E66.9 OBESITY, UNSPECIFIED CLASSIFICATION, UNSPECIFIED OBESITY TYPE, UNSPECIFIED WHETHER SERIOUS COMORBIDITY PRESENT: Primary | ICD-10-CM

## 2024-05-31 DIAGNOSIS — F52.0 HYPOACTIVE SEXUAL DESIRE: ICD-10-CM

## 2024-05-31 PROCEDURE — 3077F SYST BP >= 140 MM HG: CPT | Performed by: NURSE PRACTITIONER

## 2024-05-31 PROCEDURE — 3048F LDL-C <100 MG/DL: CPT | Performed by: NURSE PRACTITIONER

## 2024-05-31 PROCEDURE — 3061F NEG MICROALBUMINURIA REV: CPT | Performed by: NURSE PRACTITIONER

## 2024-05-31 PROCEDURE — 3044F HG A1C LEVEL LT 7.0%: CPT | Performed by: NURSE PRACTITIONER

## 2024-05-31 PROCEDURE — 3079F DIAST BP 80-89 MM HG: CPT | Performed by: NURSE PRACTITIONER

## 2024-05-31 PROCEDURE — 3008F BODY MASS INDEX DOCD: CPT | Performed by: NURSE PRACTITIONER

## 2024-05-31 PROCEDURE — 99213 OFFICE O/P EST LOW 20 MIN: CPT | Performed by: NURSE PRACTITIONER

## 2024-05-31 RX ORDER — ESTRADIOL 0.1 MG/G
CREAM VAGINAL
Qty: 42.5 G | Refills: 5 | Status: SHIPPED | OUTPATIENT
Start: 2024-05-31 | End: 2025-05-31

## 2024-05-31 NOTE — PATIENT INSTRUCTIONS
Weight Loss last visit given  Referral to Endocrinology weight loss program  Consult Endocrinology Dr. Cholo Grijalva     Continue keflex as needed w intimacy UTI prevention    Resume estrogen cream    Augustin cook and samples if in stock    f/u 3 mos     call Stacey at [phone] sooner if any concerns on nonemergency nurse line

## 2024-05-31 NOTE — PROGRESS NOTES
05/31/24   [id number]    f/u menopause symptoms,  OAB, Hx frequent UTIs     Subjective      HPI Javier Rascon is a 54 y.o. 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  PMH: HTN, OAB,   PSH: none  FH: Hodkin's Lymphoma, DM  SH: non smoker, customer service    Objective     /88 (BP Location: Right arm, Patient Position: Sitting, BP Cuff Size: Adult)   Pulse 79   Resp 18   Wt 99.8 kg (220 lb)   BMI 37.76 kg/m²    Physical Exam  General: Appears comfortable and in no apparent distress, well nourished  Head: Normocephalic, atraumatic  Neck: trachea midline  Respiratory: respirations unlabored, no wheezes, and no use of accessory muscles  Cardiovascular: at rest no dyspnea, well perfused  Skin: no visible rashes or lesions  Neurologic: grossly intact, oriented to person, place, and time  Psychiatric: mood and affect appropriate  Musculoskeletal: in chair for appt. no difficulty w upper body movement    Assessment/Plan   Problem List Items Addressed This Visit          Genitourinary and Reproductive    Hypoactive sexual desire     Other Visit Diagnoses       Obesity, unspecified classification, unspecified obesity type, unspecified whether serious comorbidity present    -  Primary    Relevant Orders    Referral to Endocrinology    Genitourinary syndrome of menopause        Relevant Medications    estradiol (Estrace) 0.01 % (0.1 mg/gram) vaginal  cream          Orders Placed This Encounter   Procedures    Referral to Endocrinology     Standing Status:   Future     Standing Expiration Date:   5/31/2025     Referral Priority:   Routine     Referral Type:   Consultation     Referral Reason:   Specialty Services Required     Referred to Provider:   Russel Grijalva MD     Requested Specialty:   Endocrinology     Number of Visits Requested:   1      Weight Loss last visit given  Referral to Endocrinology weight loss program Consult Endocrinology Dr. Cholo Grijalva     Continue keflex as needed w intimacy UTI prevention    Resume estrogen cream    Ristella coupon     Uber lube samples given    f/u 3 mos     call Stacey at [phone] sooner if any concerns on nonemergency nurse line        3 mos FSH    Stacey Vazquez, APRN-CNP  Lab Results   Component Value Date    GLUCOSE 102 (H) 01/02/2024    CALCIUM 10.4 01/02/2024     01/02/2024    K 3.7 01/02/2024    CO2 31 01/02/2024     01/02/2024    BUN 8 01/02/2024    CREATININE 0.68 01/02/2024

## 2024-07-28 DIAGNOSIS — E78.2 HYPERLIPIDEMIA, MIXED: ICD-10-CM

## 2024-07-28 DIAGNOSIS — I10 ESSENTIAL HYPERTENSION: ICD-10-CM

## 2024-07-28 DIAGNOSIS — I10 HYPERTENSION, UNSPECIFIED TYPE: ICD-10-CM

## 2024-07-28 DIAGNOSIS — E11.9 TYPE 2 DIABETES MELLITUS WITHOUT COMPLICATION, WITHOUT LONG-TERM CURRENT USE OF INSULIN (MULTI): ICD-10-CM

## 2024-07-29 RX ORDER — AMLODIPINE BESYLATE 5 MG/1
5 TABLET ORAL DAILY
Qty: 90 TABLET | Refills: 0 | Status: SHIPPED | OUTPATIENT
Start: 2024-07-29 | End: 2024-07-30 | Stop reason: SDUPTHER

## 2024-07-29 RX ORDER — ATORVASTATIN CALCIUM 20 MG/1
20 TABLET, FILM COATED ORAL DAILY
Qty: 90 TABLET | Refills: 0 | Status: SHIPPED | OUTPATIENT
Start: 2024-07-29 | End: 2024-07-30 | Stop reason: SDUPTHER

## 2024-07-30 ENCOUNTER — APPOINTMENT (OUTPATIENT)
Dept: PRIMARY CARE | Facility: CLINIC | Age: 54
End: 2024-07-30
Payer: COMMERCIAL

## 2024-07-30 ENCOUNTER — LAB (OUTPATIENT)
Dept: LAB | Facility: LAB | Age: 54
End: 2024-07-30
Payer: COMMERCIAL

## 2024-07-30 DIAGNOSIS — I10 ESSENTIAL HYPERTENSION: ICD-10-CM

## 2024-07-30 DIAGNOSIS — E11.9 TYPE 2 DIABETES MELLITUS WITHOUT COMPLICATION, WITHOUT LONG-TERM CURRENT USE OF INSULIN (MULTI): ICD-10-CM

## 2024-07-30 DIAGNOSIS — E66.01 CLASS 2 SEVERE OBESITY DUE TO EXCESS CALORIES WITH SERIOUS COMORBIDITY AND BODY MASS INDEX (BMI) OF 36.0 TO 36.9 IN ADULT (MULTI): ICD-10-CM

## 2024-07-30 DIAGNOSIS — E78.2 HYPERLIPIDEMIA, MIXED: ICD-10-CM

## 2024-07-30 DIAGNOSIS — I10 HYPERTENSION, UNSPECIFIED TYPE: ICD-10-CM

## 2024-07-30 LAB
ALBUMIN SERPL BCP-MCNC: 4.8 G/DL (ref 3.4–5)
ALP SERPL-CCNC: 124 U/L (ref 33–110)
ALT SERPL W P-5'-P-CCNC: 43 U/L (ref 7–45)
ANION GAP SERPL CALC-SCNC: 15 MMOL/L (ref 10–20)
AST SERPL W P-5'-P-CCNC: 31 U/L (ref 9–39)
BILIRUB SERPL-MCNC: 1.2 MG/DL (ref 0–1.2)
BUN SERPL-MCNC: 11 MG/DL (ref 6–23)
CALCIUM SERPL-MCNC: 10.1 MG/DL (ref 8.6–10.6)
CHLORIDE SERPL-SCNC: 97 MMOL/L (ref 98–107)
CO2 SERPL-SCNC: 31 MMOL/L (ref 21–32)
CREAT SERPL-MCNC: 0.69 MG/DL (ref 0.5–1.05)
CREAT UR-MCNC: 292.2 MG/DL (ref 20–320)
EGFRCR SERPLBLD CKD-EPI 2021: >90 ML/MIN/1.73M*2
EST. AVERAGE GLUCOSE BLD GHB EST-MCNC: 174 MG/DL
GLUCOSE SERPL-MCNC: 129 MG/DL (ref 74–99)
HBA1C MFR BLD: 7.7 %
MICROALBUMIN UR-MCNC: 68.9 MG/L
MICROALBUMIN/CREAT UR: 23.6 UG/MG CREAT
POTASSIUM SERPL-SCNC: 3.8 MMOL/L (ref 3.5–5.3)
PROT SERPL-MCNC: 7.6 G/DL (ref 6.4–8.2)
SODIUM SERPL-SCNC: 139 MMOL/L (ref 136–145)

## 2024-07-30 PROCEDURE — 82570 ASSAY OF URINE CREATININE: CPT

## 2024-07-30 PROCEDURE — 86850 RBC ANTIBODY SCREEN: CPT

## 2024-07-30 PROCEDURE — 83036 HEMOGLOBIN GLYCOSYLATED A1C: CPT

## 2024-07-30 PROCEDURE — 3048F LDL-C <100 MG/DL: CPT | Performed by: INTERNAL MEDICINE

## 2024-07-30 PROCEDURE — 82043 UR ALBUMIN QUANTITATIVE: CPT

## 2024-07-30 PROCEDURE — 36415 COLL VENOUS BLD VENIPUNCTURE: CPT

## 2024-07-30 PROCEDURE — 86901 BLOOD TYPING SEROLOGIC RH(D): CPT

## 2024-07-30 PROCEDURE — 99214 OFFICE O/P EST MOD 30 MIN: CPT | Performed by: INTERNAL MEDICINE

## 2024-07-30 PROCEDURE — 3061F NEG MICROALBUMINURIA REV: CPT | Performed by: INTERNAL MEDICINE

## 2024-07-30 PROCEDURE — 86900 BLOOD TYPING SEROLOGIC ABO: CPT

## 2024-07-30 PROCEDURE — 3044F HG A1C LEVEL LT 7.0%: CPT | Performed by: INTERNAL MEDICINE

## 2024-07-30 PROCEDURE — 80053 COMPREHEN METABOLIC PANEL: CPT

## 2024-07-30 RX ORDER — LOSARTAN POTASSIUM AND HYDROCHLOROTHIAZIDE 12.5; 5 MG/1; MG/1
1 TABLET ORAL DAILY
Qty: 90 TABLET | Refills: 0 | Status: SHIPPED | OUTPATIENT
Start: 2024-07-30

## 2024-07-30 RX ORDER — ATORVASTATIN CALCIUM 20 MG/1
20 TABLET, FILM COATED ORAL DAILY
Qty: 90 TABLET | Refills: 0 | Status: SHIPPED | OUTPATIENT
Start: 2024-07-30

## 2024-07-30 RX ORDER — AMLODIPINE BESYLATE 5 MG/1
5 TABLET ORAL DAILY
Qty: 90 TABLET | Refills: 0 | Status: SHIPPED | OUTPATIENT
Start: 2024-07-30

## 2024-07-30 RX ORDER — METFORMIN HYDROCHLORIDE 1000 MG/1
1000 TABLET ORAL
Qty: 100 TABLET | Refills: 3 | Status: SHIPPED | OUTPATIENT
Start: 2024-07-30 | End: 2025-09-03

## 2024-07-30 RX ORDER — METOPROLOL TARTRATE 25 MG/1
25 TABLET, FILM COATED ORAL 2 TIMES DAILY
Qty: 180 TABLET | Refills: 0 | Status: SHIPPED | OUTPATIENT
Start: 2024-07-30

## 2024-07-30 NOTE — PROGRESS NOTES
I reviewed the resident/fellow's documentation and discussed the patient with the resident/fellow. I agree with the resident/fellow's medical decision making as documented in the note.    Carri Lemons MD

## 2024-07-30 NOTE — PROGRESS NOTES
Subjective     HPI    54 y.o. female with a PMH significant for hypertension, hyperlipidemia, type 2 diabetes mellitus non-insulin-dependent presents to the clinic as a virtual patient follow-up visit and medication refill.  Patient doing well overall.  Blood pressure stable and well-controlled at 113/90.  She states her weight has been stable within the last breathing at 215 pounds.  She has been using Trulicity at 1.5 mg weekly with good results.  No acute concerns or new complaints. She denies any recent illness, fever, chills, sweats, chest pain, SOB, cough, palpitations, N/V, abdominal pain, diarrhea, melena, hematochezia, or urinary symptoms.,  visual abnormalities.  ROS otherwise negative.     Objective    There were no vitals taken for this visit.   BMI Readings from Last 15 Encounters:   05/31/24 37.76 kg/m²   05/21/24 37.16 kg/m²   03/01/24 35.53 kg/m²   01/02/24 37.42 kg/m²   04/20/23 37.93 kg/m²   12/20/22 37.93 kg/m²   12/15/22 37.83 kg/m²   11/01/22 38.00 kg/m²   10/18/22 37.76 kg/m²   05/03/22 36.73 kg/m²   02/14/22 35.70 kg/m²      Lab Results   Component Value Date    HGBA1C 6.3 (H) 01/02/2024      Lab Results   Component Value Date    HGBA1C 6.3 (H) 01/02/2024    HGBA1C 7.3 (A) 11/30/2022    HGBA1C 5.7 (A) 05/06/2022     Lab Results   Component Value Date    LDLCALC 73 01/02/2024    CREATININE 0.68 01/02/2024      Lab Results   Component Value Date    WBC 5.6 01/02/2024    HGB 13.2 01/02/2024    HCT 40.3 01/02/2024    MCV 88 01/02/2024     01/02/2024        Chemistry    Lab Results   Component Value Date/Time     01/02/2024 0949    K 3.7 01/02/2024 0949     01/02/2024 0949    CO2 31 01/02/2024 0949    BUN 8 01/02/2024 0949    CREATININE 0.68 01/02/2024 0949    Lab Results   Component Value Date/Time    CALCIUM 10.4 01/02/2024 0949    ALKPHOS 128 (H) 01/02/2024 0949    AST 21 01/02/2024 0949    ALT 25 01/02/2024 0949    BILITOT 1.2 01/02/2024 0949           No images are  attached to the encounter.   [unfilled]         The 10-year ASCVD risk score (Madalyn ENGEL, et al., 2019) is: 11.2%    Values used to calculate the score:      Age: 54 years      Sex: Female      Is Non- : Yes      Diabetic: Yes      Tobacco smoker: No      Systolic Blood Pressure: 140 mmHg      Is BP treated: Yes      HDL Cholesterol: 44.9 mg/dL      Total Cholesterol: 131 mg/dL     For more information, please refer to:  http://static.heart.org/riskcalc/darrius/index.html  http://tools.acc.org/wvotl-gmqa-bquraiiyz-plus/    * Atherosclerotic cardiovascular disease (ASCVD), defined as coronary death or nonfatal myocardial infarction, or fatal or nonfatal stroke, based on the Pooled Cohort Equations and the work of wilmar Reddy al., respectively.    ** The information required to estimate ASCVD risk includes age, sex, race, total cholesterol, HDL cholesterol, systolic blood pressure, blood pressure lowering medication use, diabetes status, and smoking status.     Assessment/Plan     An interactive audio and video telecommunication system which permits real time communications between the patient (at the originating site) and provider (at the distant site) was utilized to provide this telehealth service.    Verbal consent was requested and obtained from the patient on the day of encounter.  This is a virtual visit using HIPAA compliant video platform. It required patient-provider interaction for the medical decision making as documented.     I have communicated my name and active licensure. The patient's identity and physical location were verified at the time of this visit.   Either the patient or their legal representative has been informed of the risks and benefits of -- and alternatives to -- treatment through a remote evaluation and consents to proceed with the evaluation remotely.     The following medical issues were discussed during this encounter  :     #Obesity class II  -Weight 215 down  5 pounds since last visit  -Trulicity well-tolerated  -Continue Trulicity 1.5 weekly will assess the need to increase the dose at next appointment    #Hypertension  -Well-controlled 113/90  -Continue amlodipine 5 mg daily  -Continue losartan hydrochlorothiazide 50-12.5 mg daily  -Continue metoprolol 25 mg twice daily    #Hyperlipidemia  -ASCVD 11.2 %   -Continue Lipitor 20 mg daily    #Non-insulin-dependent diabetes mellitus  -Hemoglobin A1c ordered and pending  -Continue metformin 1000 mg twice daily  -Continue Trulicity 1.5 mg weekly will assess the need to increase the dose at next appointment    The following labs were ordered to be completed before the patient's next visit: Urine albumin, CMP, hemoglobin A1c    Health maintenance  Eye exam rule out diabetic retinopathy.  Patient will make an appointment with her ophthalmologist today     Please return in 3 months or if symptoms worsen     Iman Cleveland MD

## 2024-07-31 LAB
ABO GROUP (TYPE) IN BLOOD: NORMAL
ANTIBODY SCREEN: NORMAL
RH FACTOR (ANTIGEN D): NORMAL

## 2024-08-12 ENCOUNTER — APPOINTMENT (OUTPATIENT)
Dept: ENDOCRINOLOGY | Facility: CLINIC | Age: 54
End: 2024-08-12
Payer: COMMERCIAL

## 2024-08-12 VITALS
DIASTOLIC BLOOD PRESSURE: 82 MMHG | HEIGHT: 64 IN | HEART RATE: 86 BPM | SYSTOLIC BLOOD PRESSURE: 143 MMHG | TEMPERATURE: 97.2 F | WEIGHT: 220 LBS | BODY MASS INDEX: 37.56 KG/M2

## 2024-08-12 DIAGNOSIS — I10 PRIMARY HYPERTENSION: ICD-10-CM

## 2024-08-12 DIAGNOSIS — E66.01 CLASS 2 SEVERE OBESITY DUE TO EXCESS CALORIES WITH SERIOUS COMORBIDITY AND BODY MASS INDEX (BMI) OF 36.0 TO 36.9 IN ADULT (MULTI): Primary | ICD-10-CM

## 2024-08-12 DIAGNOSIS — E11.9 TYPE 2 DIABETES MELLITUS WITHOUT COMPLICATION, WITHOUT LONG-TERM CURRENT USE OF INSULIN (MULTI): ICD-10-CM

## 2024-08-12 DIAGNOSIS — E66.9 OBESITY, UNSPECIFIED CLASSIFICATION, UNSPECIFIED OBESITY TYPE, UNSPECIFIED WHETHER SERIOUS COMORBIDITY PRESENT: ICD-10-CM

## 2024-08-12 PROCEDURE — 99204 OFFICE O/P NEW MOD 45 MIN: CPT | Performed by: NURSE PRACTITIONER

## 2024-08-12 PROCEDURE — 3008F BODY MASS INDEX DOCD: CPT | Performed by: NURSE PRACTITIONER

## 2024-08-12 PROCEDURE — 3051F HG A1C>EQUAL 7.0%<8.0%: CPT | Performed by: NURSE PRACTITIONER

## 2024-08-12 PROCEDURE — 3060F POS MICROALBUMINURIA REV: CPT | Performed by: NURSE PRACTITIONER

## 2024-08-12 PROCEDURE — 3079F DIAST BP 80-89 MM HG: CPT | Performed by: NURSE PRACTITIONER

## 2024-08-12 PROCEDURE — 1036F TOBACCO NON-USER: CPT | Performed by: NURSE PRACTITIONER

## 2024-08-12 PROCEDURE — 3077F SYST BP >= 140 MM HG: CPT | Performed by: NURSE PRACTITIONER

## 2024-08-12 PROCEDURE — 3048F LDL-C <100 MG/DL: CPT | Performed by: NURSE PRACTITIONER

## 2024-08-12 RX ORDER — BLOOD-GLUCOSE SENSOR
EACH MISCELLANEOUS
Qty: 2 EACH | Refills: 11 | Status: SHIPPED | OUTPATIENT
Start: 2024-08-12

## 2024-08-12 ASSESSMENT — PATIENT HEALTH QUESTIONNAIRE - PHQ9
1. LITTLE INTEREST OR PLEASURE IN DOING THINGS: NOT AT ALL
2. FEELING DOWN, DEPRESSED OR HOPELESS: NOT AT ALL
SUM OF ALL RESPONSES TO PHQ9 QUESTIONS 1 AND 2: 0

## 2024-08-12 ASSESSMENT — ENCOUNTER SYMPTOMS
SPEECH DIFFICULTY: 0
FATIGUE: 0
NUMBNESS: 0
POLYPHAGIA: 0
FREQUENCY: 0
EYE PAIN: 0
PHOTOPHOBIA: 0
CONSTIPATION: 0
DIARRHEA: 0
RHINORRHEA: 0
ABDOMINAL DISTENTION: 0
DIZZINESS: 0
ACTIVITY CHANGE: 0
POLYDIPSIA: 0
COUGH: 0
FLANK PAIN: 0
HEADACHES: 0
BACK PAIN: 0
EYE DISCHARGE: 0

## 2024-08-12 NOTE — Clinical Note
Please schedule her for Olga and group virtually, please also schedule her for in person with me to follow up on diabetes in 6 weeks thank you

## 2024-08-12 NOTE — PROGRESS NOTES
Javier Rascon presents for weight loss management and obesity consult today.  Referred by Dr. Hernandez her urologist.  She has diabetes 2 managed by PCP.  She is currently taking Metformin but having  upset stomach with diarrhea daily and she desire to com off this medication.  She stopped the Trulicity a month ago because she was unsure if they were going to increase the dose and did not hear back so she missed 2 weeks dose, she has this dose at home. She has no side effects from the Trulicity. She had hair loss with Mounjaro (she believes).    Obesity History:  Childhood or teen obesity history: no  Age of Onset: mid adult years  Lowest adult weight: 160  Highest adult weight: 220  Current weight: 220     Family history of obesity: no    Biggest challenge with weight management:   Keeping it off    History of Weight Loss Efforts: yes  Successful weight loss techniques attempted:  phentermine  Unsuccessful weight loss techniques attempted:  phentermine helped but heart rate increase was not tolerable    Current typical daily diet:  Typical Breakfast: skip  Typical Lunch: sandwich, fast food  Typical Dinner: meat, vegetables, starch  Soda/latte weekly intake: sodas  Take out/carry out/fast food weekly:3-4  Portion sizes/seconds with meals: medium    Snacking  Daytime snacks: yes  Evening snacks: no      Describe Appetite (always hungry/no hunger/average): always hungry  Are you full after a meal?  no  Emotional eater? Yes   Boredom eater? Yes     Current Exercise Habits none    Sleep patterns (insomnia, waking in night) /hours of sleep per night: sleep apnea and uses CPAP  H/O Sleep apnea: yes  Well rested? Yes     Increased Stressors (describe daily stress): no      Any intake of an appetite suppressant or an anti-obesity medicine?  She was taking Trulicity, se above    H/O Pancreatitis: no  H/O Thyroid Medullary Cancer: no    Past Medical History:   Diagnosis Date    Body mass index (BMI) 35.0-35.9, adult  08/11/2021    Body mass index (BMI) of 35.0 to 35.9    Body mass index (BMI) 37.0-37.9, adult 02/21/2020    BMI 37.0-37.9, adult    Personal history of other diseases of the circulatory system     History of hypertension    Personal history of other mental and behavioral disorders 07/10/2018    History of depression       Current Outpatient Medications   Medication Sig Dispense Refill    amLODIPine (Norvasc) 5 mg tablet Take 1 tablet (5 mg) by mouth once daily. 90 tablet 0    atorvastatin (Lipitor) 20 mg tablet Take 1 tablet (20 mg) by mouth once daily. 90 tablet 0    azelastine (Astelin) 137 mcg (0.1 %) nasal spray Administer 2 sprays into each nostril 2 times a day.      cephalexin (Keflex) 250 mg capsule Take 1 capsule (250 mg) by mouth as needed at bedtime (activity UTI prevention). PRN w activity 90 capsule 0    cholecalciferol (Vitamin D-3) 25 MCG (1000 UT) tablet Take 1 tablet (25 mcg) by mouth once daily.      dulaglutide (Trulicity) 0.75 mg/0.5 mL pen injector Inject 1.5 mg under the skin 1 (one) time per week. 2 mL 11    estradiol (Estrace) 0.01 % (0.1 mg/gram) vaginal cream Apply nightly for 3 weeks, then 3 times per week. 42.5 g 5    levocetirizine (Xyzal) 5 mg tablet TAKE 1 TABLET (5 MG) BY MOUTH DAILY IN THE EVENING 90 tablet 0    losartan-hydrochlorothiazide (Hyzaar) 50-12.5 mg tablet Take 1 tablet by mouth once daily. 90 tablet 0    metFORMIN (Glucophage) 1,000 mg tablet Take 1 tablet (1,000 mg) by mouth 2 times daily (morning and late afternoon). 100 tablet 3    metoprolol tartrate (Lopressor) 25 mg tablet Take 1 tablet (25 mg) by mouth 2 times a day. 180 tablet 0     No current facility-administered medications for this visit.           Review of Systems  Review of Systems   Constitutional:  Negative for activity change and fatigue.   HENT:  Negative for congestion, dental problem, ear pain, mouth sores and rhinorrhea.    Eyes:  Negative for photophobia, pain and discharge.   Respiratory:  Negative  "for cough.    Cardiovascular:  Negative for leg swelling.   Gastrointestinal:  Negative for abdominal distention, constipation and diarrhea.   Endocrine: Negative for polydipsia, polyphagia and polyuria.   Genitourinary:  Negative for flank pain, frequency and urgency.   Musculoskeletal:  Negative for back pain.   Skin:  Negative for pallor and rash.   Neurological:  Negative for dizziness, speech difficulty, numbness and headaches.   Psychiatric/Behavioral:  Negative for behavioral problems.            Objective   Ht 1.626 m (5' 4\")   Wt 99.8 kg (220 lb)   BMI 37.76 kg/m²     Physical Exam  Physical Exam  Vitals reviewed.   Constitutional:       General: She is not in acute distress.     Appearance: Normal appearance. She is obese.   Neck:      Comments: Thyroid exam is normal, no palpable nodules  No goiter is noted  Cardiovascular:      Rate and Rhythm: Normal rate and regular rhythm.   Pulmonary:      Effort: Pulmonary effort is normal.      Breath sounds: Normal breath sounds.   Skin:     General: Skin is warm and dry.   Neurological:      Mental Status: She is alert and oriented to person, place, and time.   Psychiatric:         Mood and Affect: Mood normal.         Behavior: Behavior normal.         Thought Content: Thought content normal.         Judgment: Judgment normal.         Lab Review  Lab Results   Component Value Date    HGBA1C 7.7 (H) 07/30/2024     Lab Results   Component Value Date    LDLCALC 73 01/02/2024       Weight Trends  Trends of weight reviewed in visit, see graph below:  Wt Readings from Last 3 Encounters:   08/12/24 99.8 kg (220 lb)   05/31/24 99.8 kg (220 lb)   05/21/24 98.2 kg (216 lb 7.5 oz)   (  Assessment/Plan     Follow up and Goals:    Medication-Restart and take the .75 mg Trulicity for two weeks and once  completed and then message em to begin the 1.5 mg dose  Start the sensor: https://www.Lenda.com/watch?v=U_[email or web address]   please review this video on the Blue that we watched " together  Please have diabetes education consult regarding the sensor  Nutrition- consult with Olga, read over booklet  Physical fitness- 20 minute walk after dinner and then 2 days of strength training or resistance bands, our goal is 150 minutes per week for physical activity, while losing weight we desire to avoid muscle wasting so the weights are important. [employer] site [employer] to [employer]    Problem List Items Addressed This Visit       Class 2 severe obesity with serious comorbidity and body mass index (BMI) of 36.0 to 36.9 in adult (Multi) - Primary    Diabetes (Multi)     Other Visit Diagnoses       Primary hypertension        Obesity, unspecified classification, unspecified obesity type, unspecified whether serious comorbidity present                Diet interventions: referral to dietitian for guidance in these changes and Mediterranean  Discussed Mediterranean Diet, given pamphlet or it will be mailed, we looked at ADA plate, portion sizes, recipes. Advised to review in preparation for nutrition consult    Handouts given:  yes    Exercise intervention:   We discussed the importance of incorporating resistance and free weight  lifting into physical activity routine to prevent muscle wasting with weight loss, enhance bone health, the positive role increased muscle has in burning fat at rest. We looked at examples of these types of exercise routines online. Advised to do modifications. Advised to check with PCP if there is a h/o musculoskeletal injury or hx. We discussed benefits of walking with weight loss and as a cardio form of exercise. Gradually increase exercise to a goal of 150 minutes at least per week.        Follow Up:  Referred to nutrition or continue to work with current dietitian   Discussed obesity medications, side effects and mechanism of action reviewed with patient  Discussed physical activity: we reviewed [employer] videos for strength training, advised to use modifications for these videos, we  discussed benefits of strength training and resistance bands  on bone and muscle health, we discussed walking and water aerobic options for cardio  Discussed Mediterranean Diet, given pamphlet or it will be mailed, we looked at ADA plate, portion sizes, recipes. Advised to review Mediterranean Meal Plan booklet  in preparation for nutrition consult  ....  1 month group visit and nutrition visit in person or  virtual  Please reach out if you need anything or have further questions

## 2024-08-12 NOTE — PATIENT INSTRUCTIONS
Medication-Restart and take the .75 mg Trulicity for two weeks and once  completed and then message em to begin the 1.5 mg dose  Start the sensor: https://www.[employer].com/watch?v=U_[email or web address]-[email or web address] please review this video on the Blue that we watched together  Please have diabetes education consult regarding the sensor  Nutrition- consult with Olga, read over booklet  Physical fitness- 20 minute walk after dinner and then 2 days of strength training or resistance bands, our goal is 150 minutes per week for physical activity, while losing weight we desire to avoid muscle wasting so the weights are important. [employer] site [employer]

## 2024-08-21 ENCOUNTER — TELEPHONE (OUTPATIENT)
Dept: ENDOCRINOLOGY | Facility: CLINIC | Age: 54
End: 2024-08-21
Payer: COMMERCIAL

## 2024-08-21 DIAGNOSIS — E11.9 CONTROLLED TYPE 2 DIABETES MELLITUS WITHOUT COMPLICATION, WITHOUT LONG-TERM CURRENT USE OF INSULIN (MULTI): Primary | ICD-10-CM

## 2024-08-21 NOTE — TELEPHONE ENCOUNTER
Patient messaging that she is has take 3rd dose of the Trulicty and having no issues and is ready to increase in the dose.   Ordered

## 2024-08-27 ENCOUNTER — APPOINTMENT (OUTPATIENT)
Dept: UROLOGY | Facility: CLINIC | Age: 54
End: 2024-08-27
Payer: COMMERCIAL

## 2024-08-27 ENCOUNTER — OFFICE VISIT (OUTPATIENT)
Dept: ALLERGY | Facility: CLINIC | Age: 54
End: 2024-08-27
Payer: COMMERCIAL

## 2024-08-27 DIAGNOSIS — R51.9 CHRONIC NONINTRACTABLE HEADACHE, UNSPECIFIED HEADACHE TYPE: ICD-10-CM

## 2024-08-27 DIAGNOSIS — J30.89 NON-SEASONAL ALLERGIC RHINITIS DUE TO OTHER ALLERGIC TRIGGER: Primary | ICD-10-CM

## 2024-08-27 DIAGNOSIS — J30.0 VASOMOTOR RHINITIS: ICD-10-CM

## 2024-08-27 DIAGNOSIS — G89.29 CHRONIC NONINTRACTABLE HEADACHE, UNSPECIFIED HEADACHE TYPE: ICD-10-CM

## 2024-08-27 PROCEDURE — 3048F LDL-C <100 MG/DL: CPT | Performed by: ALLERGY & IMMUNOLOGY

## 2024-08-27 PROCEDURE — 3060F POS MICROALBUMINURIA REV: CPT | Performed by: ALLERGY & IMMUNOLOGY

## 2024-08-27 PROCEDURE — 99214 OFFICE O/P EST MOD 30 MIN: CPT | Performed by: ALLERGY & IMMUNOLOGY

## 2024-08-27 PROCEDURE — 3051F HG A1C>EQUAL 7.0%<8.0%: CPT | Performed by: ALLERGY & IMMUNOLOGY

## 2024-08-27 RX ORDER — FLUTICASONE PROPIONATE 50 MCG
2 SPRAY, SUSPENSION (ML) NASAL
Qty: 16 G | Refills: 11 | Status: SHIPPED | OUTPATIENT
Start: 2024-08-27 | End: 2025-08-27

## 2024-08-27 RX ORDER — AZELASTINE 1 MG/ML
2 SPRAY, METERED NASAL NIGHTLY
Qty: 30 ML | Refills: 11 | Status: SHIPPED | OUTPATIENT
Start: 2024-08-27

## 2024-08-27 NOTE — PROGRESS NOTES
Subjective   Patient ID:   [id number]   Javier Rascon is a 54 y.o. female who presents for Allergies, Med Refill, and Allergic Rhinitis.    Chief Complaint   Patient presents with    Allergies    Med Refill    Allergic Rhinitis          Med Refill      This patient is here to evaluate for:    Nasal congestion and headache   The nasal spray, azelastine, was helping.    She requests refills.    Headache - frontal area, dull ache. Lasts for days.     No epistaxis.     Has used flonase already but azelastine works better.   Today we discussed using both.    Review of Systems   All other systems reviewed and are negative.        Objective     There were no vitals taken for this visit.     Physical Exam  Constitutional:       General: She is not in acute distress.     Appearance: Normal appearance. She is not ill-appearing.   HENT:      Head: Normocephalic and atraumatic.      Right Ear: Tympanic membrane, ear canal and external ear normal.      Left Ear: Tympanic membrane, ear canal and external ear normal.      Nose: Nose normal. No congestion or rhinorrhea.      Mouth/Throat:      Mouth: Mucous membranes are moist.      Pharynx: Oropharynx is clear. No oropharyngeal exudate or posterior oropharyngeal erythema.   Eyes:      General:         Right eye: No discharge.         Left eye: No discharge.      Conjunctiva/sclera: Conjunctivae normal.   Cardiovascular:      Rate and Rhythm: Normal rate and regular rhythm.      Heart sounds: Normal heart sounds. No murmur heard.     No friction rub. No gallop.   Pulmonary:      Effort: Pulmonary effort is normal. No respiratory distress.      Breath sounds: Normal breath sounds. No stridor. No wheezing, rhonchi or rales.   Chest:      Chest wall: No tenderness.   Abdominal:      General: Abdomen is flat.      Palpations: Abdomen is soft.   Musculoskeletal:         General: Normal range of motion.      Cervical back: Normal range of motion and neck supple.   Lymphadenopathy:       Cervical: No cervical adenopathy.   Skin:     General: Skin is warm and dry.      Findings: No erythema, lesion or rash.   Neurological:      General: No focal deficit present.      Mental Status: She is alert. Mental status is at baseline.   Psychiatric:         Mood and Affect: Mood normal.         Behavior: Behavior normal.         Thought Content: Thought content normal.         Judgment: Judgment normal.            Current Outpatient Medications   Medication Sig Dispense Refill    amLODIPine (Norvasc) 5 mg tablet Take 1 tablet (5 mg) by mouth once daily. 90 tablet 0    atorvastatin (Lipitor) 20 mg tablet Take 1 tablet (20 mg) by mouth once daily. 90 tablet 0    azelastine (Astelin) 137 mcg (0.1 %) nasal spray Administer 2 sprays into each nostril 2 times a day.      cholecalciferol (Vitamin D-3) 25 MCG (1000 UT) tablet Take 1 tablet (25 mcg) by mouth once daily.      dulaglutide (Trulicity) 1.5 mg/0.5 mL pen injector injection Inject 1.5 mg under the skin 1 (one) time per week. 12 each 3    estradiol (Estrace) 0.01 % (0.1 mg/gram) vaginal cream Apply nightly for 3 weeks, then 3 times per week. 42.5 g 5    FreeStyle Blue 3 Sensor device Apply fir 14 days and  then remove it and apply a new sensor 2 each 11    levocetirizine (Xyzal) 5 mg tablet TAKE 1 TABLET (5 MG) BY MOUTH DAILY IN THE EVENING 90 tablet 0    losartan-hydrochlorothiazide (Hyzaar) 50-12.5 mg tablet Take 1 tablet by mouth once daily. 90 tablet 0    metFORMIN (Glucophage) 1,000 mg tablet Take 1 tablet (1,000 mg) by mouth 2 times daily (morning and late afternoon). 100 tablet 3    metoprolol tartrate (Lopressor) 25 mg tablet Take 1 tablet (25 mg) by mouth 2 times a day. 180 tablet 0     No current facility-administered medications for this visit.       Summary of the labs over the past 6 months:    Lab on 07/30/2024   Component Date Value Ref Range Status    Glucose 07/30/2024 129 (H)  74 - 99 mg/dL Final    Sodium 07/30/2024 139  136 - 145 mmol/L  Final    Potassium 07/30/2024 3.8  3.5 - 5.3 mmol/L Final    Chloride 07/30/2024 97 (L)  98 - 107 mmol/L Final    Bicarbonate 07/30/2024 31  21 - 32 mmol/L Final    Anion Gap 07/30/2024 15  10 - 20 mmol/L Final    Urea Nitrogen 07/30/2024 11  6 - 23 mg/dL Final    Creatinine 07/30/2024 0.69  0.50 - 1.05 mg/dL Final    eGFR 07/30/2024 >90  >60 mL/min/1.73m*2 Final    Calcium 07/30/2024 10.1  8.6 - 10.6 mg/dL Final    Albumin 07/30/2024 4.8  3.4 - 5.0 g/dL Final    Alkaline Phosphatase 07/30/2024 124 (H)  33 - 110 U/L Final    Total Protein 07/30/2024 7.6  6.4 - 8.2 g/dL Final    AST 07/30/2024 31  9 - 39 U/L Final    Bilirubin, Total 07/30/2024 1.2  0.0 - 1.2 mg/dL Final    ALT 07/30/2024 43  7 - 45 U/L Final    Albumin, Urine Random 07/30/2024 68.9  Not established mg/L Final    Creatinine, Urine Random 07/30/2024 292.2  20.0 - 320.0 mg/dL Final    Albumin/Creatinine Ratio 07/30/2024 23.6  <30.0 ug/mg Creat Final    Hemoglobin A1C 07/30/2024 7.7 (H)  see below % Final    Estimated Average Glucose 07/30/2024 174  Not Established mg/dL Final    ABO TYPE 07/30/2024 O   Final    Rh TYPE 07/30/2024 POS   Final    ANTIBODY SCREEN 07/30/2024 NEG   Final         Assessment/Plan   Diagnoses and all orders for this visit:  Non-seasonal allergic rhinitis due to other allergic trigger  -     fluticasone (Flonase) 50 mcg/actuation nasal spray; Administer 2 sprays into each nostril once daily with breakfast. Shake gently. Before first use, prime pump. After use, clean tip and replace cap.  -     azelastine (Astelin) 137 mcg (0.1 %) nasal spray; Administer 2 sprays into each nostril once daily at bedtime.  Vasomotor rhinitis  -     fluticasone (Flonase) 50 mcg/actuation nasal spray; Administer 2 sprays into each nostril once daily with breakfast. Shake gently. Before first use, prime pump. After use, clean tip and replace cap.  -     azelastine (Astelin) 137 mcg (0.1 %) nasal spray; Administer 2 sprays into each nostril once daily  at bedtime.  Chronic nonintractable headache, unspecified headache type  -     fluticasone (Flonase) 50 mcg/actuation nasal spray; Administer 2 sprays into each nostril once daily with breakfast. Shake gently. Before first use, prime pump. After use, clean tip and replace cap.  -     azelastine (Astelin) 137 mcg (0.1 %) nasal spray; Administer 2 sprays into each nostril once daily at bedtime.      Azelastine is medically necessary and she is advised to use both nasal sprays as above.     Follow-up in 1 year so we may re-assess you and develop a more long term plan.       Simon Rayo MD

## 2024-09-04 ENCOUNTER — TELEMEDICINE (OUTPATIENT)
Dept: PRIMARY CARE | Facility: CLINIC | Age: 54
End: 2024-09-04
Payer: COMMERCIAL

## 2024-09-04 DIAGNOSIS — G89.29 CHRONIC NONINTRACTABLE HEADACHE, UNSPECIFIED HEADACHE TYPE: ICD-10-CM

## 2024-09-04 DIAGNOSIS — K21.9 GASTROESOPHAGEAL REFLUX DISEASE, UNSPECIFIED WHETHER ESOPHAGITIS PRESENT: ICD-10-CM

## 2024-09-04 DIAGNOSIS — R51.9 CHRONIC NONINTRACTABLE HEADACHE, UNSPECIFIED HEADACHE TYPE: ICD-10-CM

## 2024-09-04 DIAGNOSIS — I10 ESSENTIAL HYPERTENSION: Primary | ICD-10-CM

## 2024-09-04 PROCEDURE — 99214 OFFICE O/P EST MOD 30 MIN: CPT | Performed by: INTERNAL MEDICINE

## 2024-09-04 PROCEDURE — 3048F LDL-C <100 MG/DL: CPT | Performed by: INTERNAL MEDICINE

## 2024-09-04 PROCEDURE — 3060F POS MICROALBUMINURIA REV: CPT | Performed by: INTERNAL MEDICINE

## 2024-09-04 PROCEDURE — 3051F HG A1C>EQUAL 7.0%<8.0%: CPT | Performed by: INTERNAL MEDICINE

## 2024-09-04 RX ORDER — LOSARTAN POTASSIUM AND HYDROCHLOROTHIAZIDE 12.5; 1 MG/1; MG/1
1 TABLET ORAL DAILY
Qty: 30 TABLET | Refills: 2 | Status: SHIPPED | OUTPATIENT
Start: 2024-09-04 | End: 2024-12-03

## 2024-09-04 RX ORDER — PANTOPRAZOLE SODIUM 20 MG/1
20 TABLET, DELAYED RELEASE ORAL
Qty: 90 TABLET | Refills: 0 | Status: SHIPPED | OUTPATIENT
Start: 2024-09-04 | End: 2024-12-03

## 2024-09-04 ASSESSMENT — ENCOUNTER SYMPTOMS
ABDOMINAL PAIN: 0
SHORTNESS OF BREATH: 0

## 2024-09-25 ENCOUNTER — APPOINTMENT (OUTPATIENT)
Dept: ENDOCRINOLOGY | Facility: CLINIC | Age: 54
End: 2024-09-25
Payer: COMMERCIAL

## 2024-09-25 VITALS
TEMPERATURE: 97 F | SYSTOLIC BLOOD PRESSURE: 130 MMHG | HEART RATE: 80 BPM | DIASTOLIC BLOOD PRESSURE: 80 MMHG | RESPIRATION RATE: 18 BRPM | WEIGHT: 217 LBS | BODY MASS INDEX: 37.05 KG/M2 | HEIGHT: 64 IN

## 2024-09-25 DIAGNOSIS — E78.2 HYPERLIPIDEMIA, MIXED: ICD-10-CM

## 2024-09-25 DIAGNOSIS — E66.09 CLASS 2 OBESITY DUE TO EXCESS CALORIES WITHOUT SERIOUS COMORBIDITY WITH BODY MASS INDEX (BMI) OF 37.0 TO 37.9 IN ADULT: Primary | ICD-10-CM

## 2024-09-25 DIAGNOSIS — E11.9 TYPE 2 DIABETES MELLITUS WITHOUT COMPLICATION, WITHOUT LONG-TERM CURRENT USE OF INSULIN (MULTI): ICD-10-CM

## 2024-09-25 PROCEDURE — 3051F HG A1C>EQUAL 7.0%<8.0%: CPT | Performed by: NURSE PRACTITIONER

## 2024-09-25 PROCEDURE — 99214 OFFICE O/P EST MOD 30 MIN: CPT | Performed by: NURSE PRACTITIONER

## 2024-09-25 PROCEDURE — 3048F LDL-C <100 MG/DL: CPT | Performed by: NURSE PRACTITIONER

## 2024-09-25 PROCEDURE — 1036F TOBACCO NON-USER: CPT | Performed by: NURSE PRACTITIONER

## 2024-09-25 PROCEDURE — 3008F BODY MASS INDEX DOCD: CPT | Performed by: NURSE PRACTITIONER

## 2024-09-25 PROCEDURE — 3079F DIAST BP 80-89 MM HG: CPT | Performed by: NURSE PRACTITIONER

## 2024-09-25 PROCEDURE — 3060F POS MICROALBUMINURIA REV: CPT | Performed by: NURSE PRACTITIONER

## 2024-09-25 PROCEDURE — 3075F SYST BP GE 130 - 139MM HG: CPT | Performed by: NURSE PRACTITIONER

## 2024-09-25 RX ORDER — DULAGLUTIDE 3 MG/.5ML
3 INJECTION, SOLUTION SUBCUTANEOUS
Qty: 2 ML | Refills: 5 | Status: SHIPPED | OUTPATIENT
Start: 2024-09-29

## 2024-09-25 ASSESSMENT — ENCOUNTER SYMPTOMS
DYSPHORIC MOOD: 0
FREQUENCY: 0
ARTHRALGIAS: 0
NAUSEA: 0
WHEEZING: 0
SHORTNESS OF BREATH: 0
POLYPHAGIA: 0
NERVOUS/ANXIOUS: 0
NUMBNESS: 0
CONSTIPATION: 0
FATIGUE: 0
PALPITATIONS: 0
POLYDIPSIA: 0

## 2024-09-25 NOTE — PATIENT INSTRUCTIONS
Medications: Increase the Trulicity to the 3.0 dose, re-apply the sensor  Nutrition: Olga consult. Focus on the caloric daily goal, focus on limiting foods that increase glucose values and use the sensor to assist  Physical fitness: Kenn the walking time on phon calendar and move 30 minutes to either lunch or dinner. Try to find two days in the or weekend to do 2  videos of strength. YouTube: [employer]  Obtain labs on 10/30/24  Follow scheduled is 10/3-nutrition and 11/7-group. Schedule diabetes follow up 1/2025

## 2024-09-25 NOTE — PROGRESS NOTES
Javier Rascon presents for weight loss management and obesity and diabetes follow up today.    She has DM2, HTN, hyperlipidemia.  HTN and hyperlipidemia well managed with medications noted below.    She started using the Blue sensor , she was able to see her numbers and she like this however she says that the Blue fell off.    We planned on her restarting the .75 mg Trulicity for two weeks and increasing to the 1.5 mg weekly is she tolerated it. She did tolerate it and increased to the 1.5 mg weekly and has had 4 doses. She say she is tolerating it very well and has no side effects thus far.    She is scheduled with Olga Veloz on 10/3/24.    Physical fitness- we discussed the following last visit:  20 minute walk after dinner and then 2 days of strength training or resistance bands, our goal is 150 minutes per week for physical activity - NOT YET MET  Due to time she says this has been inconsistent    Appetite: She says after two week of  the 1.5 mg dose she says that she had significant decrease in the snacking and the portions of her meals have decreased  has decreased and the amount    She also says she is intentionally stocking her house with healthier snacks    Weight: Loss three pounds since the last visit    She has been scheduled for obesity group on 11/7/24    Patient Active Problem List   Diagnosis    Class 2 severe obesity with serious comorbidity and body mass index (BMI) of 36.0 to 36.9 in adult (Multi)    Abnormal CT of the chest    Abnormal mammogram    Allergic rhinitis    Anemia    Anxiety    Arthralgia of right knee    Cervical lymphadenopathy    Chronic constipation    Diabetes (Multi)    Dizziness    Essential hypertension    Excessive daytime sleepiness    Fibroids    Headache    Headache, migraine    Hearing loss    Heart murmur    Hyperlipidemia, mixed    Impaired fasting glucose    Hypoactive sexual desire    IUD threads lost    Menopause syndrome    Menorrhagia with regular cycle     Obstructive sleep apnea    Perimenopausal    Ptosis, right    Proteinuria    Recurrent urinary tract infection    Urgency of urination    Urinary frequency    Urinary, incontinence, stress female    Vitamin D deficiency    Weight loss    Witnessed apneic spells    Weight gain    Vasomotor rhinitis    Gastroesophageal reflux disease       Past Medical History:   Diagnosis Date    Body mass index (BMI) 35.0-35.9, adult 08/11/2021    Body mass index (BMI) of 35.0 to 35.9    Body mass index (BMI) 37.0-37.9, adult 02/21/2020    BMI 37.0-37.9, adult    Personal history of other diseases of the circulatory system     History of hypertension    Personal history of other mental and behavioral disorders 07/10/2018    History of depression       Current Outpatient Medications   Medication Sig Dispense Refill    amLODIPine (Norvasc) 5 mg tablet Take 1 tablet (5 mg) by mouth once daily. 90 tablet 0    atorvastatin (Lipitor) 20 mg tablet Take 1 tablet (20 mg) by mouth once daily. 90 tablet 0    azelastine (Astelin) 137 mcg (0.1 %) nasal spray Administer 2 sprays into each nostril once daily at bedtime. 30 mL 11    cholecalciferol (Vitamin D-3) 25 MCG (1000 UT) tablet Take 1 tablet (25 mcg) by mouth once daily.      dulaglutide (Trulicity) 1.5 mg/0.5 mL pen injector injection Inject 1.5 mg under the skin 1 (one) time per week. 12 each 3    estradiol (Estrace) 0.01 % (0.1 mg/gram) vaginal cream Apply nightly for 3 weeks, then 3 times per week. 42.5 g 5    fluticasone (Flonase) 50 mcg/actuation nasal spray Administer 2 sprays into each nostril once daily with breakfast. Shake gently. Before first use, prime pump. After use, clean tip and replace cap. 16 g 11    FreeStyle Blue 3 Sensor device Apply fir 14 days and  then remove it and apply a new sensor 2 each 11    levocetirizine (Xyzal) 5 mg tablet TAKE 1 TABLET (5 MG) BY MOUTH DAILY IN THE EVENING 90 tablet 0    losartan-hydrochlorothiazide (Hyzaar) 100-12.5 mg tablet Take 1  "tablet by mouth once daily. 30 tablet 2    metFORMIN (Glucophage) 1,000 mg tablet Take 1 tablet (1,000 mg) by mouth 2 times daily (morning and late afternoon). 100 tablet 3    metoprolol tartrate (Lopressor) 25 mg tablet Take 1 tablet (25 mg) by mouth 2 times a day. 180 tablet 0    pantoprazole (ProtoNix) 20 mg EC tablet Take 1 tablet (20 mg) by mouth once daily in the morning. Take before meals. Do not crush, chew, or split. 90 tablet 0     No current facility-administered medications for this visit.           Review of Systems  Review of Systems   Constitutional:  Negative for fatigue.   Respiratory:  Negative for shortness of breath and wheezing.    Cardiovascular:  Negative for chest pain and palpitations.   Gastrointestinal:  Negative for constipation and nausea.   Endocrine: Negative for polydipsia, polyphagia and polyuria.   Genitourinary:  Negative for frequency and urgency.   Musculoskeletal:  Negative for arthralgias.   Skin:  Negative for rash.   Neurological:  Negative for numbness.   Psychiatric/Behavioral:  Negative for dysphoric mood. The patient is not nervous/anxious.            Objective   /80 (BP Location: Right arm, Patient Position: Sitting)   Pulse 80   Temp 36.1 °C (97 °F)   Resp 18   Ht 1.626 m (5' 4\")   Wt 98.4 kg (217 lb)   BMI 37.25 kg/m²     Physical Exam  Physical Exam    Lab Review  Lab Results   Component Value Date    HGBA1C 7.7 (H) 07/30/2024     Lab Results   Component Value Date    LDLCALC 73 01/02/2024     .last  Weight Trends  Trends of weight reviewed in visit, see graph below:  Wt Readings from Last 3 Encounters:   09/25/24 98.4 kg (217 lb)   08/12/24 99.8 kg (220 lb)   05/31/24 99.8 kg (220 lb)        08/12/24 09:47 09/25/24 09:03   /82 130/80     Assessment/Plan     Follow up and Goals:  Medications: Increase the Trulicity to the 3.0 dose, re-apply the sensor  Nutrition: Olga consult. Focus on the caloric daily goal, focus on limiting foods that increase " glucose values and use the sensor to assist  Physical fitness: Kenn the walking time on phon calendar and move 30 minutes to either lunch or dinner. Try to find two days in the or weekend to do 2  videos of strength. YouTube: [facility]  Obtain labs on 10/30/24  Follow scheduled is 10/3-nutrition and 11/7-group. Schedule diabetes follow up 1/2025     Problem List Items Addressed This Visit    None    Problem List Items Addressed This Visit       Diabetes (Multi)    Hyperlipidemia, mixed     Other Visit Diagnoses       Class 2 obesity due to excess calories without serious comorbidity with body mass index (BMI) of 37.0 to 37.9 in adult    -  Primary    Relevant Medications    dulaglutide (Trulicity) 3 mg/0.5 mL pen injector (Start on 9/29/2024)    Other Relevant Orders    Hemoglobin A1C    Lipid Panel    Albumin-Creatinine Ratio, Urine Random    Referral to Podiatry

## 2024-09-30 ENCOUNTER — HOSPITAL ENCOUNTER (EMERGENCY)
Facility: HOSPITAL | Age: 54
Discharge: LEFT BEFORE DISCHARGE | End: 2024-09-30
Payer: COMMERCIAL

## 2024-09-30 PROCEDURE — 4500999001 HC ED NO CHARGE

## 2024-10-03 ENCOUNTER — APPOINTMENT (OUTPATIENT)
Dept: ENDOCRINOLOGY | Facility: CLINIC | Age: 54
End: 2024-10-03
Payer: COMMERCIAL

## 2024-10-03 VITALS — BODY MASS INDEX: 37.05 KG/M2 | WEIGHT: 217 LBS | HEIGHT: 64 IN

## 2024-10-03 DIAGNOSIS — E66.01 CLASS 2 SEVERE OBESITY DUE TO EXCESS CALORIES WITH SERIOUS COMORBIDITY AND BODY MASS INDEX (BMI) OF 36.0 TO 36.9 IN ADULT: ICD-10-CM

## 2024-10-03 DIAGNOSIS — Z71.3 DIETARY COUNSELING: ICD-10-CM

## 2024-10-03 DIAGNOSIS — E11.9 TYPE 2 DIABETES MELLITUS WITHOUT COMPLICATION, WITHOUT LONG-TERM CURRENT USE OF INSULIN (MULTI): Primary | ICD-10-CM

## 2024-10-03 DIAGNOSIS — E66.812 CLASS 2 SEVERE OBESITY DUE TO EXCESS CALORIES WITH SERIOUS COMORBIDITY AND BODY MASS INDEX (BMI) OF 36.0 TO 36.9 IN ADULT: ICD-10-CM

## 2024-10-03 PROCEDURE — 97802 MEDICAL NUTRITION INDIV IN: CPT | Performed by: DIETITIAN, REGISTERED

## 2024-10-03 NOTE — PROGRESS NOTES
"Initial Nutrition Assessment    Patient was referred to nutrition by ORA Fowler  for  weight management/desire to lose weight, as well as for education on healthy eating for consideration of Type 2 DM. Other PMHX significant for HLD and HTN. Pertinent labs reviewed which show recent A1c of 7.7%. DM medications include Trulicity and Metformin. Was wearing the betsy sensor however due to sensor falling often has not been wearing such recently so data unable to be reviewed at time of visit.     Diet recall reveals a meal pattern of two meals daily and some snacks with recent implementation of some IF. Reported intakes of some calorically dense foods likely contributing to lack of desired weight loss/contributing to weight gain over time.  Fluids not fully meeting recommendations in type and amount as pt reports regular consumption of sugar sweetened beverages, further likely limiting desired weight loss. Pt is incorporating consistent physical activity, meeting weekly recommendations with cardio but would likely benefit from the incorporation of some resistance training to assist in achieving goals. See all interventions/recommendations below as discussed during visit this day.     Patient reported symptoms: Difficulty losing weight    Anthropometrics:  Height:   Ht Readings from Last 1 Encounters:   09/25/24 1.626 m (5' 4\")      Weight:   Wt Readings from Last 10 Encounters:   09/25/24 98.4 kg (217 lb)   08/12/24 99.8 kg (220 lb)   05/31/24 99.8 kg (220 lb)   05/21/24 98.2 kg (216 lb 7.5 oz)   03/01/24 93.9 kg (207 lb)   01/02/24 98.9 kg (218 lb)   04/20/23 100 kg (221 lb)   12/20/22 100 kg (221 lb)   12/15/22 100 kg (220 lb 6 oz)   11/01/22 100 kg (221 lb 6 oz)      Current BMI:   BMI Readings from Last 1 Encounters:   09/25/24 37.25 kg/m²        Labs:  Lab Results   Component Value Date    HGBA1C 7.7 (H) 07/30/2024      Lab Results   Component Value Date    CHOL 131 01/02/2024    CHOL 173 11/30/2022    " CHOL 147 05/06/2022     Lab Results   Component Value Date    HDL 44.9 01/02/2024    HDL 40.9 11/30/2022    HDL 41.4 05/06/2022     Lab Results   Component Value Date    LDLCALC 73 01/02/2024     Lab Results   Component Value Date    TRIG 68 01/02/2024    TRIG 118 11/30/2022    TRIG 58 05/06/2022       Malnutrition Screening:  Significant Unintentional weight loss: No  Eating less than 75% of usual intake for more than 2 weeks: No  Potential Signs of Inflammation: No    Recommended Malnutrition Diagnosis: No malnutrition identified    Diet Recall-  Breakfast- none due to IF  Lunch (12 PM)- breakfast sandwich with lugo, egg and cheese on potato bread  Afternoon snack- none OR has a bag of chips OR nuts OR a small amount of fries  Dinner (5 PM)- various proteins such as chicken, starch such as rice and various vegetables   Evening Snacks- none OR nuts OR popcorn  Beverages- coffee in am with SF creamer, water throughout the day, regular Snapple tea and regular cranberry juice occasionally   Alcohol- occasionally will have a [employer] cooler  Physical Activity- 30 minutes of walking several times weekly on a walking pad    Other pertinent patient reported Information:  - Taking medication Trulicity since August and feels it is helping some with appetite but only one the second dose now for a week   - Feels barriers to weight loss is trying to eat healthy with family members food preferences as well as less healthy eating often on the weekends     Nutrition Diagnosis: Food and nutrition-related knowledge deficit related to lack of recent exposure to information as evidenced by verbalizes inaccurate or incomplete information.    Readiness to Learn:  Cognitive ability: Alert and oriented  Motivation to learn: Interested  Family Support: Unable to assess- family not present  Instruction provided to: Patient  Patient learns best by: Multiple methods  Factors affecting learning: None   Physical limitations affecting  learning: None    Education Materials Provided:   Your Mediterranean Meal Plan Booklet    Nutrition Interventions/Recommendations for 10/3/2024:  - Please refer to your book entitled: Your Mediterranean Meal Plan, and follow Mediterranean Diet eating guidelines as reviewed.  - The Healthy Plate style of eating can be a helpful tool for incorporating healthy balanced meals in appropriate portions. (Healthy Plate: Start with a 9-inch diameter plate. Fill 1/2 the plate with non-starchy vegetables, 1/4 of the plate with whole grains or starchy vegetables, and 1/4 of the plate with a lean source of protein.   - Aim for no more than 45 grams of carbohydrate each main meal.   - Choose foods high in fiber such as whole grain breads, cereals, pasta, brown rice, fruits, and vegetables. These foods are digested more slowly, which helps to better control blood sugar levels.  - Choose foods with less sugar and fats.   - Avoid high calorie snack foods and baked goods such as chips, cakes, cookies, etc.   - Consider pre-planning healthy meals and healthier snacks for the week. Refer to your book for both menu and recipe ideas to get you started.  - Aim for 64 ounces of water daily and please consider elimination of all sugar sweetened beverages as discussed.  - Aim for 150 minutes of moderate-intensity physical activity per week with both your walking pad and at least two days per week of resistance training.  - Consider purchasing protective patches to place over the sensor to secure it as discussed such as Skin  OR FixiC patches which can be purchased on [employer].  - Follow-up as scheduled for the group classes in November.    Nutrition Monitoring & Evaluation: adherence to recommendations and patient stated goals    Need for follow-up:  November [facility] as scheduled    Referred by: Marilou Noble, APRN-CNP     MNT Billing Type: Medical Nutrition Assessment, each 15 min increment, for 3 increments.    SIGNATURE:   Olga Veloz  DALE, SHELLEY, WOJCIECH, CDCES                                                        DATE:   10/3/2024

## 2024-10-03 NOTE — PATIENT INSTRUCTIONS
- Please refer to your book entitled: Your Mediterranean Meal Plan, and follow Mediterranean Diet eating guidelines as reviewed.  - The Healthy Plate style of eating can be a helpful tool for incorporating healthy balanced meals in appropriate portions. (Healthy Plate: Start with a 9-inch diameter plate. Fill 1/2 the plate with non-starchy vegetables, 1/4 of the plate with whole grains or starchy vegetables, and 1/4 of the plate with a lean source of protein.   - Aim for no more than 45 grams of carbohydrate each main meal.   - Choose foods high in fiber such as whole grain breads, cereals, pasta, brown rice, fruits, and vegetables. These foods are digested more slowly, which helps to better control blood sugar levels.  - Choose foods with less sugar and fats.   - Avoid high calorie snack foods and baked goods such as chips, cakes, cookies, etc.   - Consider pre-planning healthy meals and healthier snacks for the week. Refer to your book for both menu and recipe ideas to get you started.  - Aim for 64 ounces of water daily and please consider elimination of all sugar sweetened beverages as discussed.  - Aim for 150 minutes of moderate-intensity physical activity per week with both your walking pad and at least two days per week of resistance training.  - Consider purchasing protective patches to place over the sensor to secure it as discussed such as Skin  OR FixiC patches which can be purchased on [employer].  - Follow-up as scheduled for the group classes in November.

## 2024-10-21 ENCOUNTER — APPOINTMENT (OUTPATIENT)
Dept: ENDOCRINOLOGY | Facility: CLINIC | Age: 54
End: 2024-10-21
Payer: COMMERCIAL

## 2024-10-24 ENCOUNTER — TELEPHONE (OUTPATIENT)
Dept: ENDOCRINOLOGY | Facility: CLINIC | Age: 54
End: 2024-10-24
Payer: COMMERCIAL

## 2024-10-24 DIAGNOSIS — E11.65 CONTROLLED TYPE 2 DIABETES MELLITUS WITH HYPERGLYCEMIA, WITHOUT LONG-TERM CURRENT USE OF INSULIN: Primary | ICD-10-CM

## 2024-10-24 RX ORDER — TIRZEPATIDE 5 MG/.5ML
0.5 INJECTION, SOLUTION SUBCUTANEOUS
Qty: 2 ML | Refills: 2 | Status: SHIPPED | OUTPATIENT
Start: 2024-10-27

## 2024-10-24 NOTE — TELEPHONE ENCOUNTER
Prior authorization denied  Patient must meet all requirements before Mounjaro will be covered

## 2024-10-24 NOTE — TELEPHONE ENCOUNTER
Patient with hemoglobin A1c 7.3 11/30/2022  She requests Mounjaro, will place order  
Oriented - self; Oriented - place; Oriented - time

## 2024-10-29 ENCOUNTER — TELEPHONE (OUTPATIENT)
Dept: ENDOCRINOLOGY | Facility: CLINIC | Age: 54
End: 2024-10-29
Payer: COMMERCIAL

## 2024-10-29 ENCOUNTER — APPOINTMENT (OUTPATIENT)
Dept: OBSTETRICS AND GYNECOLOGY | Facility: CLINIC | Age: 54
End: 2024-10-29
Payer: COMMERCIAL

## 2024-10-29 DIAGNOSIS — E11.9 CONTROLLED TYPE 2 DIABETES MELLITUS WITHOUT COMPLICATION, WITHOUT LONG-TERM CURRENT USE OF INSULIN (MULTI): ICD-10-CM

## 2024-10-29 DIAGNOSIS — E11.9 CONTROLLED TYPE 2 DIABETES MELLITUS WITHOUT COMPLICATION, WITHOUT LONG-TERM CURRENT USE OF INSULIN (MULTI): Primary | ICD-10-CM

## 2024-10-29 DIAGNOSIS — E66.812 CLASS 2 OBESITY: ICD-10-CM

## 2024-10-29 RX ORDER — DULAGLUTIDE 4.5 MG/.5ML
4 INJECTION, SOLUTION SUBCUTANEOUS
Qty: 2 ML | Refills: 5 | Status: SHIPPED | OUTPATIENT
Start: 2024-11-03 | End: 2024-10-30

## 2024-10-30 RX ORDER — DULAGLUTIDE 4.5 MG/.5ML
4 INJECTION, SOLUTION SUBCUTANEOUS
Qty: 4 PEN | Refills: 5 | Status: SHIPPED | OUTPATIENT
Start: 2024-11-03

## 2024-10-31 ENCOUNTER — APPOINTMENT (OUTPATIENT)
Dept: UROLOGY | Facility: CLINIC | Age: 54
End: 2024-10-31
Payer: COMMERCIAL

## 2024-10-31 VITALS
HEART RATE: 96 BPM | WEIGHT: 211 LBS | BODY MASS INDEX: 36.02 KG/M2 | DIASTOLIC BLOOD PRESSURE: 90 MMHG | SYSTOLIC BLOOD PRESSURE: 138 MMHG | TEMPERATURE: 96.9 F | HEIGHT: 64 IN

## 2024-10-31 DIAGNOSIS — R35.0 URINARY FREQUENCY: Primary | ICD-10-CM

## 2024-10-31 DIAGNOSIS — N39.0 RECURRENT UTI: ICD-10-CM

## 2024-10-31 DIAGNOSIS — N32.89 BLADDER SPASMS: ICD-10-CM

## 2024-10-31 DIAGNOSIS — N95.8 GENITOURINARY SYNDROME OF MENOPAUSE: ICD-10-CM

## 2024-10-31 LAB
APPEARANCE UR: CLEAR
BILIRUB UR STRIP.AUTO-MCNC: NEGATIVE MG/DL
COLOR UR: YELLOW
GLUCOSE UR STRIP.AUTO-MCNC: NORMAL MG/DL
HOLD SPECIMEN: NORMAL
HYALINE CASTS #/AREA URNS AUTO: ABNORMAL /LPF
KETONES UR STRIP.AUTO-MCNC: NEGATIVE MG/DL
LEUKOCYTE ESTERASE UR QL STRIP.AUTO: NEGATIVE
MUCOUS THREADS #/AREA URNS AUTO: ABNORMAL /LPF
NITRITE UR QL STRIP.AUTO: NEGATIVE
PH UR STRIP.AUTO: 6.5 [PH]
POC APPEARANCE, URINE: CLEAR
POC BILIRUBIN, URINE: NEGATIVE
POC BLOOD, URINE: ABNORMAL
POC COLOR, URINE: YELLOW
POC GLUCOSE, URINE: NEGATIVE MG/DL
POC KETONES, URINE: NEGATIVE MG/DL
POC LEUKOCYTES, URINE: NEGATIVE
POC NITRITE,URINE: NEGATIVE
POC PH, URINE: 6 PH
POC PROTEIN, URINE: NEGATIVE MG/DL
POC SPECIFIC GRAVITY, URINE: 1.02
POC UROBILINOGEN, URINE: 0.2 EU/DL
PROT UR STRIP.AUTO-MCNC: NEGATIVE MG/DL
RBC # UR STRIP.AUTO: ABNORMAL /UL
RBC #/AREA URNS AUTO: ABNORMAL /HPF
SP GR UR STRIP.AUTO: 1.02
SQUAMOUS #/AREA URNS AUTO: ABNORMAL /HPF
UROBILINOGEN UR STRIP.AUTO-MCNC: NORMAL MG/DL
WBC #/AREA URNS AUTO: ABNORMAL /HPF

## 2024-10-31 PROCEDURE — 1036F TOBACCO NON-USER: CPT | Performed by: NURSE PRACTITIONER

## 2024-10-31 PROCEDURE — 99214 OFFICE O/P EST MOD 30 MIN: CPT | Performed by: NURSE PRACTITIONER

## 2024-10-31 PROCEDURE — 3048F LDL-C <100 MG/DL: CPT | Performed by: NURSE PRACTITIONER

## 2024-10-31 PROCEDURE — 51798 US URINE CAPACITY MEASURE: CPT | Performed by: NURSE PRACTITIONER

## 2024-10-31 PROCEDURE — 3080F DIAST BP >= 90 MM HG: CPT | Performed by: NURSE PRACTITIONER

## 2024-10-31 PROCEDURE — 3008F BODY MASS INDEX DOCD: CPT | Performed by: NURSE PRACTITIONER

## 2024-10-31 PROCEDURE — 3051F HG A1C>EQUAL 7.0%<8.0%: CPT | Performed by: NURSE PRACTITIONER

## 2024-10-31 PROCEDURE — 3075F SYST BP GE 130 - 139MM HG: CPT | Performed by: NURSE PRACTITIONER

## 2024-10-31 PROCEDURE — 81001 URINALYSIS AUTO W/SCOPE: CPT

## 2024-10-31 PROCEDURE — 81003 URINALYSIS AUTO W/O SCOPE: CPT | Performed by: NURSE PRACTITIONER

## 2024-10-31 PROCEDURE — 3060F POS MICROALBUMINURIA REV: CPT | Performed by: NURSE PRACTITIONER

## 2024-10-31 RX ORDER — NITROFURANTOIN MACROCRYSTALS 50 MG/1
CAPSULE ORAL
Qty: 30 CAPSULE | Refills: 2 | Status: SHIPPED | OUTPATIENT
Start: 2024-10-31

## 2024-10-31 RX ORDER — CEPHALEXIN 250 MG/1
250 CAPSULE ORAL AS NEEDED
COMMUNITY
Start: 2024-09-04

## 2024-11-07 ENCOUNTER — APPOINTMENT (OUTPATIENT)
Dept: ENDOCRINOLOGY | Facility: CLINIC | Age: 54
End: 2024-11-07
Payer: COMMERCIAL

## 2024-11-19 ENCOUNTER — APPOINTMENT (OUTPATIENT)
Dept: PRIMARY CARE | Facility: CLINIC | Age: 54
End: 2024-11-19
Payer: COMMERCIAL

## 2024-11-19 VITALS
BODY MASS INDEX: 36.02 KG/M2 | WEIGHT: 211 LBS | HEIGHT: 64 IN | HEART RATE: 90 BPM | DIASTOLIC BLOOD PRESSURE: 95 MMHG | SYSTOLIC BLOOD PRESSURE: 158 MMHG

## 2024-11-19 DIAGNOSIS — E78.2 HYPERLIPIDEMIA, MIXED: ICD-10-CM

## 2024-11-19 DIAGNOSIS — R51.9 CHRONIC NONINTRACTABLE HEADACHE, UNSPECIFIED HEADACHE TYPE: ICD-10-CM

## 2024-11-19 DIAGNOSIS — E11.9 TYPE 2 DIABETES MELLITUS WITHOUT COMPLICATION, WITHOUT LONG-TERM CURRENT USE OF INSULIN (MULTI): ICD-10-CM

## 2024-11-19 DIAGNOSIS — G47.33 OBSTRUCTIVE SLEEP APNEA: ICD-10-CM

## 2024-11-19 DIAGNOSIS — I10 ESSENTIAL HYPERTENSION: Primary | ICD-10-CM

## 2024-11-19 DIAGNOSIS — M79.671 RIGHT FOOT PAIN: ICD-10-CM

## 2024-11-19 DIAGNOSIS — R73.01 IMPAIRED FASTING GLUCOSE: ICD-10-CM

## 2024-11-19 DIAGNOSIS — I10 HYPERTENSION, UNSPECIFIED TYPE: ICD-10-CM

## 2024-11-19 DIAGNOSIS — G89.29 CHRONIC NONINTRACTABLE HEADACHE, UNSPECIFIED HEADACHE TYPE: ICD-10-CM

## 2024-11-19 DIAGNOSIS — E55.9 VITAMIN D DEFICIENCY: ICD-10-CM

## 2024-11-19 PROCEDURE — 3008F BODY MASS INDEX DOCD: CPT | Performed by: INTERNAL MEDICINE

## 2024-11-19 PROCEDURE — 1036F TOBACCO NON-USER: CPT | Performed by: INTERNAL MEDICINE

## 2024-11-19 PROCEDURE — 3060F POS MICROALBUMINURIA REV: CPT | Performed by: INTERNAL MEDICINE

## 2024-11-19 PROCEDURE — 3051F HG A1C>EQUAL 7.0%<8.0%: CPT | Performed by: INTERNAL MEDICINE

## 2024-11-19 PROCEDURE — 3077F SYST BP >= 140 MM HG: CPT | Performed by: INTERNAL MEDICINE

## 2024-11-19 PROCEDURE — 3048F LDL-C <100 MG/DL: CPT | Performed by: INTERNAL MEDICINE

## 2024-11-19 PROCEDURE — 99214 OFFICE O/P EST MOD 30 MIN: CPT | Performed by: INTERNAL MEDICINE

## 2024-11-19 PROCEDURE — 3080F DIAST BP >= 90 MM HG: CPT | Performed by: INTERNAL MEDICINE

## 2024-11-19 RX ORDER — LOSARTAN POTASSIUM AND HYDROCHLOROTHIAZIDE 12.5; 1 MG/1; MG/1
1 TABLET ORAL DAILY
Qty: 90 TABLET | Refills: 0 | Status: SHIPPED | OUTPATIENT
Start: 2024-11-19 | End: 2025-02-17

## 2024-11-19 RX ORDER — METOPROLOL TARTRATE 25 MG/1
25 TABLET, FILM COATED ORAL 2 TIMES DAILY
Qty: 180 TABLET | Refills: 0 | Status: SHIPPED | OUTPATIENT
Start: 2024-11-19

## 2024-11-19 RX ORDER — ATORVASTATIN CALCIUM 20 MG/1
20 TABLET, FILM COATED ORAL DAILY
Qty: 90 TABLET | Refills: 0 | Status: SHIPPED | OUTPATIENT
Start: 2024-11-19

## 2024-11-19 RX ORDER — AMLODIPINE BESYLATE 5 MG/1
5 TABLET ORAL DAILY
Qty: 90 TABLET | Refills: 0 | Status: SHIPPED | OUTPATIENT
Start: 2024-11-19

## 2024-11-19 ASSESSMENT — LIFESTYLE VARIABLES
HOW MANY STANDARD DRINKS CONTAINING ALCOHOL DO YOU HAVE ON A TYPICAL DAY: 1 OR 2
HOW OFTEN DO YOU HAVE A DRINK CONTAINING ALCOHOL: MONTHLY OR LESS
AUDIT-C TOTAL SCORE: 1
SKIP TO QUESTIONS 9-10: 1
HOW OFTEN DO YOU HAVE SIX OR MORE DRINKS ON ONE OCCASION: NEVER

## 2024-11-19 ASSESSMENT — ENCOUNTER SYMPTOMS
GASTROINTESTINAL NEGATIVE: 1
CARDIOVASCULAR NEGATIVE: 1
RESPIRATORY NEGATIVE: 1
CONSTITUTIONAL NEGATIVE: 1

## 2024-11-19 NOTE — PROGRESS NOTES
Chief Complaint:   Chief Complaint   Patient presents with    Follow-up     Declined flu vaccine / refills        Javier Rascon is a 54 y.o. year old female is here for follow-up.   HPI   Javier Rascon is here for follow up on chronic conditions.  Reports no new issues.  Compliant with medications.  Denies side effects.  Needs refills on medications.  Chart reviewed, pharmacy updated, prior notes, labs, imaging, EKGs reviewed.    Past Medical History   Past Medical History:   Diagnosis Date    Body mass index (BMI) 35.0-35.9, adult 2021    Body mass index (BMI) of 35.0 to 35.9    Body mass index (BMI) 37.0-37.9, adult 2020    BMI 37.0-37.9, adult    Personal history of other diseases of the circulatory system     History of hypertension    Personal history of other mental and behavioral disorders 07/10/2018    History of depression      Past Surgical History:   Past Surgical History:   Procedure Laterality Date     SECTION, CLASSIC  10/01/2015     Section    OTHER SURGICAL HISTORY  10/01/2015    Open Fracture Reduction    OTHER SURGICAL HISTORY  2022    Total hysterectomy abdominal     Family History:   Family History   Problem Relation Name Age of Onset    Hypertension Mother      Hypertension Father      Diabetes Father      Heart attack Maternal Grandmother      Diabetes Half-Sister Paternal     Hypertension Half-Sister Maternal     Diabetes Half-Brother Paternal      Social History:   Tobacco Use: Medium Risk (2024)    Patient History     Smoking Tobacco Use: Former     Smokeless Tobacco Use: Never     Passive Exposure: Past      Social History     Substance and Sexual Activity   Alcohol Use Not Currently    Comment: Rarely        Allergies:   Allergies   Allergen Reactions    Sulfamethoxazole-Trimethoprim Itching        ROS   Review of Systems   Constitutional: Negative.    Respiratory: Negative.     Cardiovascular: Negative.    Gastrointestinal: Negative.      "    Objective   Vitals:    11/19/24 0952   BP: (!) 158/95   Pulse: 90      BMI Readings from Last 15 Encounters:   11/19/24 36.22 kg/m²   10/31/24 36.22 kg/m²   10/03/24 37.25 kg/m²   09/25/24 37.25 kg/m²   08/12/24 37.76 kg/m²   05/31/24 37.76 kg/m²   05/21/24 37.16 kg/m²   03/01/24 35.53 kg/m²   01/02/24 37.42 kg/m²   04/20/23 37.93 kg/m²   12/20/22 37.93 kg/m²   12/15/22 37.83 kg/m²   11/01/22 38.00 kg/m²   10/18/22 37.76 kg/m²   05/03/22 36.73 kg/m²      95.7 kg (211 lb) (11/19/2024  9:52 AM)      Physical Exam  Physical Exam  Neurological:      Mental Status: She is alert.   Psychiatric:         Mood and Affect: Mood normal.          Labs:  CBC:  Recent Labs     01/02/24  0949 07/23/23  0713 03/05/23  0701   WBC 5.6 7.3 6.4   HGB 13.2 12.9 12.8   HCT 40.3 39.6 38.8    403 436   MCV 88 90 86     CMP:  Recent Labs     07/30/24  1023 01/02/24  0949 07/23/23  0713    141 140   K 3.8 3.7 3.3*   CL 97* 101 98   CO2 31 31 34*   ANIONGAP 15 13 11   BUN 11 8 15   CREATININE 0.69 0.68 0.77   EGFR >90 >90  --    GLUCOSE 129* 102* 100*     Recent Labs     07/30/24  1023 01/02/24  0949 03/05/23  0701   ALBUMIN 4.8 4.8 4.1   ALKPHOS 124* 128* 109   ALT 43 25 95*   AST 31 21 101*   BILITOT 1.2 1.2 1.0   LIPASE  --   --  22     Calcium/Phos:   Lab Results   Component Value Date    CALCIUM 10.1 07/30/2024      COAG: No results for input(s): \"INR\", \"DDIMERVTE\" in the last 44203 hours.  CRP: No results found for: \"CRP\"   [unfilled]   ENDO:  Recent Labs     07/30/24  1023 01/02/24  0949 11/30/22  0920 05/06/22  0718 08/11/21  1520 03/02/21  0810 08/21/20  0915   TSH  --  1.07 0.66  --  0.58  --  0.52   HGBA1C 7.7* 6.3* 7.3* 5.7* 5.6   < > 6.0    < > = values in this interval not displayed.      CARDIAC:   Recent Labs     03/05/23  0701   TROPHS 4     Recent Labs     01/02/24  0949 11/30/22  0920 05/06/22  0718 08/11/21  1520   CHOL 131 173 147 163   LDLF  --  109* 94 104*   LDLCALC 73  --   --   --    HDL 44.9 " 40.9 41.4 40.9   TRIG 68 118 58 93     No data recorded    Current Medications:  Current Outpatient Medications   Medication Sig Dispense Refill    amLODIPine (Norvasc) 5 mg tablet Take 1 tablet (5 mg) by mouth once daily. 90 tablet 0    atorvastatin (Lipitor) 20 mg tablet Take 1 tablet (20 mg) by mouth once daily. 90 tablet 0    azelastine (Astelin) 137 mcg (0.1 %) nasal spray Administer 2 sprays into each nostril once daily at bedtime. 30 mL 11    cephalexin (Keflex) 250 mg capsule Take 1 capsule (250 mg) by mouth if needed.      cholecalciferol (Vitamin D-3) 25 MCG (1000 UT) tablet Take 1 tablet (25 mcg) by mouth once daily.      dulaglutide (Trulicity) 4.5 mg/0.5 mL pen injector INJECT 4 MG UNDER THE SKIN 1 (ONE) TIME PER WEEK. 4 Pen 5    estradiol (Estrace) 0.01 % (0.1 mg/gram) vaginal cream Apply nightly for 3 weeks, then 3 times per week. 42.5 g 5    fluticasone (Flonase) 50 mcg/actuation nasal spray Administer 2 sprays into each nostril once daily with breakfast. Shake gently. Before first use, prime pump. After use, clean tip and replace cap. 16 g 11    levocetirizine (Xyzal) 5 mg tablet TAKE 1 TABLET (5 MG) BY MOUTH DAILY IN THE EVENING 90 tablet 0    losartan-hydrochlorothiazide (Hyzaar) 100-12.5 mg tablet Take 1 tablet by mouth once daily. 90 tablet 0    metFORMIN (Glucophage) 1,000 mg tablet Take 1 tablet (1,000 mg) by mouth 2 times daily (morning and late afternoon). 100 tablet 3    metoprolol tartrate (Lopressor) 25 mg tablet Take 1 tablet (25 mg) by mouth 2 times a day. 180 tablet 0    naltrexone-bupropion (Contrave) 8-90 mg ER tablet Take 2 tablets by mouth 2 times a day. 60 tablet 3    nitrofurantoin (Macrodantin) 50 mg capsule One capsule by mouth as needed with intercourse for UTI prevention 30 capsule 2    pantoprazole (ProtoNix) 20 mg EC tablet Take 1 tablet (20 mg) by mouth once daily in the morning. Take before meals. Do not crush, chew, or split. 90 tablet 0    vibegron 75 mg tablet Take 1  tablet (75 mg) by mouth once daily for 28 doses. 28 tablet 0     No current facility-administered medications for this visit.       Assessment and Plan  Javier was seen today for follow-up.  Diagnoses and all orders for this visit:  Essential hypertension (Primary)  -     CBC; Future  -     Comprehensive Metabolic Panel; Future  -     TSH with reflex to Free T4 if abnormal; Future  -     Hemoglobin A1C; Future  -     Lipid Panel; Future  -     Vitamin D 25-Hydroxy,Total (for eval of Vitamin D levels); Future  -     Albumin-Creatinine Ratio, Urine Random; Future  -     losartan-hydrochlorothiazide (Hyzaar) 100-12.5 mg tablet; Take 1 tablet by mouth once daily.  -     amLODIPine (Norvasc) 5 mg tablet; Take 1 tablet (5 mg) by mouth once daily.  -     metoprolol tartrate (Lopressor) 25 mg tablet; Take 1 tablet (25 mg) by mouth 2 times a day.  -     atorvastatin (Lipitor) 20 mg tablet; Take 1 tablet (20 mg) by mouth once daily.  Chronic nonintractable headache, unspecified headache type  -     CBC; Future  -     Comprehensive Metabolic Panel; Future  -     TSH with reflex to Free T4 if abnormal; Future  -     Hemoglobin A1C; Future  -     Lipid Panel; Future  -     Vitamin D 25-Hydroxy,Total (for eval of Vitamin D levels); Future  -     Albumin-Creatinine Ratio, Urine Random; Future  Type 2 diabetes mellitus without complication, without long-term current use of insulin (Multi)  -     CBC; Future  -     Comprehensive Metabolic Panel; Future  -     TSH with reflex to Free T4 if abnormal; Future  -     Hemoglobin A1C; Future  -     Lipid Panel; Future  -     Vitamin D 25-Hydroxy,Total (for eval of Vitamin D levels); Future  -     Albumin-Creatinine Ratio, Urine Random; Future  -     Referral to Clinical Pharmacy; Future  -     CT cardiac scoring wo IV contrast; Future  -     amLODIPine (Norvasc) 5 mg tablet; Take 1 tablet (5 mg) by mouth once daily.  -     metoprolol tartrate (Lopressor) 25 mg tablet; Take 1 tablet (25  mg) by mouth 2 times a day.  -     atorvastatin (Lipitor) 20 mg tablet; Take 1 tablet (20 mg) by mouth once daily.  Hyperlipidemia, mixed  -     CBC; Future  -     Comprehensive Metabolic Panel; Future  -     TSH with reflex to Free T4 if abnormal; Future  -     Hemoglobin A1C; Future  -     Lipid Panel; Future  -     Vitamin D 25-Hydroxy,Total (for eval of Vitamin D levels); Future  -     Albumin-Creatinine Ratio, Urine Random; Future  -     CT cardiac scoring wo IV contrast; Future  -     amLODIPine (Norvasc) 5 mg tablet; Take 1 tablet (5 mg) by mouth once daily.  -     metoprolol tartrate (Lopressor) 25 mg tablet; Take 1 tablet (25 mg) by mouth 2 times a day.  -     atorvastatin (Lipitor) 20 mg tablet; Take 1 tablet (20 mg) by mouth once daily.  Impaired fasting glucose  -     CBC; Future  -     Comprehensive Metabolic Panel; Future  -     TSH with reflex to Free T4 if abnormal; Future  -     Hemoglobin A1C; Future  -     Lipid Panel; Future  -     Vitamin D 25-Hydroxy,Total (for eval of Vitamin D levels); Future  -     Albumin-Creatinine Ratio, Urine Random; Future  Obstructive sleep apnea  -     CBC; Future  -     Comprehensive Metabolic Panel; Future  -     TSH with reflex to Free T4 if abnormal; Future  -     Hemoglobin A1C; Future  -     Lipid Panel; Future  -     Vitamin D 25-Hydroxy,Total (for eval of Vitamin D levels); Future  -     Albumin-Creatinine Ratio, Urine Random; Future  Vitamin D deficiency  -     CBC; Future  -     Comprehensive Metabolic Panel; Future  -     TSH with reflex to Free T4 if abnormal; Future  -     Hemoglobin A1C; Future  -     Lipid Panel; Future  -     Vitamin D 25-Hydroxy,Total (for eval of Vitamin D levels); Future  -     Albumin-Creatinine Ratio, Urine Random; Future  Right foot pain  -     XR foot right 3+ views; Future  -     Referral to Podiatry; Future  Hypertension, unspecified type  -     amLODIPine (Norvasc) 5 mg tablet; Take 1 tablet (5 mg) by mouth once daily.  -      metoprolol tartrate (Lopressor) 25 mg tablet; Take 1 tablet (25 mg) by mouth 2 times a day.  -     atorvastatin (Lipitor) 20 mg tablet; Take 1 tablet (20 mg) by mouth once daily.      HTN.  Well controlled.  Continue with current medications.  Check BP at home daily.  Report to us if the numbers are higher than 130/80.    The patient and I have had an extensive discussion regarding the importance of blood pressure management and control as well as the importance of following up with us, taking medications appropriately and following up on medical advice.   Patient expressed understanding and acknowledges importance of compliance.     DM  Repeat A1C  Clinical pharmacy consult for Mounjaro coverage  Start exercise program  Having pain in the R foot, unable to walk, get Xray and consult podiatrist,  Start swimming        HLD  Stable  Continue with statins  Check lipids    The 10-year ASCVD risk score (Madalyn ENGEL, et al., 2019) is: 17.4%    Values used to calculate the score:      Age: 54 years      Sex: Female      Is Non- : Yes      Diabetic: Yes      Tobacco smoker: No      Systolic Blood Pressure: 158 mmHg      Is BP treated: Yes      HDL Cholesterol: 44.9 mg/dL      Total Cholesterol: 131 mg/dL    Repeat CT cardiac score        By optimizing your health through good nutrition, daily exercise, stress management, low/moderate alcohol and avoidance of tobacco, sugar and processed foods, you can help to decrease your risk of cardiovascular disease and stroke and achieve a healthy weight. A diet rich in whole, plant-based foods such as vegetables, beans, seeds, nuts, whole grains, healthy fats and fruit - leads to lower body mass index, blood pressure, HbA1C, and cholesterol levels.         Immunizations:  Immunization History   Administered Date(s) Administered    Pfizer Purple Cap SARS-CoV-2 03/16/2021, 04/06/2021, 01/06/2022    Tdap vaccine, age 7 year and older (BOOSTRIX, ADACEL) 10/01/2015,  03/07/2018

## 2024-11-22 ENCOUNTER — TELEMEDICINE (OUTPATIENT)
Dept: PHARMACY | Facility: HOSPITAL | Age: 54
End: 2024-11-22
Payer: COMMERCIAL

## 2024-11-22 DIAGNOSIS — R35.0 URINARY FREQUENCY: ICD-10-CM

## 2024-11-22 NOTE — PROGRESS NOTES
Patient ID: Javier Rascon is a 54 y.o. female who presents for Overactive bladder.    Referring Provider: Stacey Vazquez   Pt was referred for cost assistance     Preferred Pharmacy:    [facility]/pharmacy #[id number] - [place], [place] - [address] [facility] [address] [facility]  [address] [address] [address]  Phone: [phone] Fax: [phone]    [facility] - [place] - [facility]  [facility]  [place] [address]  Phone: [phone] Fax: [phone]    [id number] - [place], [place] - [address] [facility]  [address] [facility] [address]  Phone: [phone] Fax: [phone]     [facility]  [address] [facility] [address] [facility] [facility] [address]  [facility] [address]  Phone: [phone] Fax: [phone]    [facility] - [place] [address] [address]  Phone: [phone] Fax: [phone]      Copay assistance:   Do you have copays on your medications? Yes  Do you have trouble affording your current medications? Yes     Patient Assistance Screening (VAF)    Patient verbally reports monthly or yearly income which is less than 400% federal poverty level   Application for program has been submitted for the following medications:   Gemtesa   Patient has been informed that program team will be reaching out to them to discuss necessary documentation, instructed to answer phone/return voicemail.   Patient aware this process may take up to 6 weeks.   If approved medication must be filled through [facility] pharmacy and may be picked up or mailed to patient.       Subjective      Vaginal Symptoms  Vaginal Dryness: no   Dysuria (pain, burning, stinging, or itching with urination): no   Vaginal and/or vulvar irritation/itching: No   Recurrent urinary tract infections: Yes, last UTI about a month ago   Lab Results   Component Value Date    ESTRADIOLFRE  "0.30 11/10/2021    PROGESTERONE <0.3 11/10/2021    ESTRADIOL 12 11/10/2021    FSH 40.5 01/02/2024       Urinary Symptoms  Medications that may contribute to symptoms:   Losartan/hydrochlorothiazide - dicussed importance of taking in morning; patient confirmed taking in morning  Amlodipine - tolerating without issue   Any history of:   Narrow-angle glaucoma? No  Impaired gastric emptying? No   Urinary retention? No    If diabetes, blood sugar controlled? HbA1c 7.7%  Frequently of bowel movement? Daily   Tobacco use? No   How many protective undergarments are you utilizing daily? No     What medications have been tried/stopped?   Solifenacin - stopped when switched to Gemtesa for better results   Current medication? Gemtesa 75 mg daily   When started? October 31st 2024  Side effects? No   Improvement in symptoms? Yes      Cardiovascular Health  The 10-year ASCVD risk score (Madalyn ENGEL, et al., 2019) is: 17.4%    Values used to calculate the score:      Age: 54 years      Sex: Female      Is Non- : Yes      Diabetic: Yes      Tobacco smoker: No      Systolic Blood Pressure: 158 mmHg      Is BP treated: Yes      HDL Cholesterol: 44.9 mg/dL      Total Cholesterol: 131 mg/dL    Lab Results   Component Value Date    CHOL 131 01/02/2024     Lab Results   Component Value Date    HDL 44.9 01/02/2024     Lab Results   Component Value Date    LDLCALC 73 01/02/2024     Lab Results   Component Value Date    TRIG 68 01/02/2024     No components found for: \"CHOLHDL\"        Blood Sugar Balance  Lab Results   Component Value Date    GLUCOSE 129 (H) 07/30/2024    HGBA1C 7.7 (H) 07/30/2024    HGBA1C 6.3 (H) 01/02/2024    HGBA1C 7.3 (A) 11/30/2022     No results found for: \"LEPTIN\", \"INSULFAST\", \"GLUF\"      Thyroid  Lab Results   Component Value Date    TSH 1.07 01/02/2024       Iron Status  Lab Results   Component Value Date    IRON 47 10/11/2019    TIBC 344 10/11/2019    FERRITIN 72 10/11/2019        Kidney " Function  Lab Results   Component Value Date    GFRF >90 07/23/2023    CREATININE 0.69 07/30/2024       Potassium  Lab Results   Component Value Date    K 3.8 07/30/2024        Vitamin D3  Lab Results   Component Value Date    VITD25 78 01/02/2024       Current Outpatient Medications on File Prior to Visit   Medication Sig Dispense Refill    amLODIPine (Norvasc) 5 mg tablet Take 1 tablet (5 mg) by mouth once daily. 90 tablet 0    atorvastatin (Lipitor) 20 mg tablet Take 1 tablet (20 mg) by mouth once daily. 90 tablet 0    azelastine (Astelin) 137 mcg (0.1 %) nasal spray Administer 2 sprays into each nostril once daily at bedtime. (Patient taking differently: Administer 2 sprays into each nostril once daily at bedtime. As needed) 30 mL 11    cholecalciferol (Vitamin D-3) 25 MCG (1000 UT) tablet Take 1 tablet (25 mcg) by mouth once daily.      dulaglutide (Trulicity) 4.5 mg/0.5 mL pen injector INJECT 4 MG UNDER THE SKIN 1 (ONE) TIME PER WEEK. 4 Pen 5    estradiol (Estrace) 0.01 % (0.1 mg/gram) vaginal cream Apply nightly for 3 weeks, then 3 times per week. 42.5 g 5    fluticasone (Flonase) 50 mcg/actuation nasal spray Administer 2 sprays into each nostril once daily with breakfast. Shake gently. Before first use, prime pump. After use, clean tip and replace cap. 16 g 11    levocetirizine (Xyzal) 5 mg tablet TAKE 1 TABLET (5 MG) BY MOUTH DAILY IN THE EVENING (Patient taking differently: Take 1 tablet (5 mg) by mouth once daily in the evening. As needed) 90 tablet 0    losartan-hydrochlorothiazide (Hyzaar) 100-12.5 mg tablet Take 1 tablet by mouth once daily. 90 tablet 0    metFORMIN (Glucophage) 1,000 mg tablet Take 1 tablet (1,000 mg) by mouth 2 times daily (morning and late afternoon). 100 tablet 3    metoprolol tartrate (Lopressor) 25 mg tablet Take 1 tablet (25 mg) by mouth 2 times a day. 180 tablet 0    nitrofurantoin (Macrodantin) 50 mg capsule One capsule by mouth as needed with intercourse for UTI prevention 30  capsule 2    pantoprazole (ProtoNix) 20 mg EC tablet Take 1 tablet (20 mg) by mouth once daily in the morning. Take before meals. Do not crush, chew, or split. (Patient taking differently: Take 1 tablet (20 mg) by mouth once daily in the morning. Take before meals. As needed) 90 tablet 0    vibegron 75 mg tablet Take 1 tablet (75 mg) by mouth once daily for 28 doses. 28 tablet 0    naltrexone-bupropion (Contrave) 8-90 mg ER tablet Take 2 tablets by mouth 2 times a day. (Patient not taking: Reported on 11/22/2024) 60 tablet 3    [DISCONTINUED] cephalexin (Keflex) 250 mg capsule Take 1 capsule (250 mg) by mouth if needed.       No current facility-administered medications on file prior to visit.        Medication and allergy reconciliation completed     Drug Interactions   No significant drug interactions identified    Assessment/Plan     Patient is experiencing urinary frequency. Since starting the Gemtesa, her frequency has decreased. Gemtesa does require PA from insurance, which was just approved. Given insurance is going to cover medication, no PAP is needed. Will send 30 day prescription to [facility] so patient does not run out, but will send follow up prescriptions to  pharmacy for mail order. Patient agreeable with plan.   Has tried before solifenacin   Patient does not need to apply [place]    Gemtesa   Discussed MOA: works by activating beta-3 adrenergic receptors in the bladder resulting in relaxation of the detrusor smooth muscle during the urine storage phase, thus increasing bladder capacity.  Provided education on administration and potential side effects including but not limited to hot flashes <2%, constipation/diarrhea <2%, dry mouth <2%, and headache <4%.   Gemtesa (vibegron) starts working almost immediately - within a few days of first taking it, with noticeable improvements in urinary urgency, frequency, and incontinence noted in clinical trials at 2 weeks which were reported as significant by 12  weeks.    LABS  Lab Results   Component Value Date    GFRF >90 07/23/2023     GFR >90 ml/min as of 7/30/2024      CONTINUE  Gemtesa 75 mg once daily    Follow-up: 12/23/24 at 1 pm       Time spent with pt: Total length of time 40 (minutes) of the encounter and more than 50% was spent counseling the patient.      Sharon Odell, PharmD      Continue all meds under the continuation of care with the referring provider and clinical pharmacy team.    Verbal consent to manage patient's drug therapy was obtained from the patient and/or an individual authorized to act on behalf of a patient. They were informed they may decline to participate or withdraw from participation in pharmacy services at any time.

## 2024-11-26 PROCEDURE — RXMED WILLOW AMBULATORY MEDICATION CHARGE

## 2024-11-26 RX ORDER — VIBEGRON 75 MG/1
75 TABLET, FILM COATED ORAL DAILY
Qty: 30 TABLET | Refills: 0 | Status: SHIPPED | OUTPATIENT
Start: 2024-11-26

## 2024-11-26 RX ORDER — VIBEGRON 75 MG/1
75 TABLET, FILM COATED ORAL DAILY
Qty: 90 TABLET | Refills: 3 | Status: SHIPPED | OUTPATIENT
Start: 2024-12-17

## 2024-11-27 ENCOUNTER — PHARMACY VISIT (OUTPATIENT)
Dept: PHARMACY | Facility: CLINIC | Age: 54
End: 2024-11-27
Payer: MEDICAID

## 2024-12-04 ENCOUNTER — APPOINTMENT (OUTPATIENT)
Dept: PHARMACY | Facility: HOSPITAL | Age: 54
End: 2024-12-04
Payer: COMMERCIAL

## 2024-12-04 ENCOUNTER — LAB (OUTPATIENT)
Dept: LAB | Facility: LAB | Age: 54
End: 2024-12-04
Payer: COMMERCIAL

## 2024-12-04 DIAGNOSIS — R73.01 IMPAIRED FASTING GLUCOSE: ICD-10-CM

## 2024-12-04 DIAGNOSIS — G89.29 CHRONIC NONINTRACTABLE HEADACHE, UNSPECIFIED HEADACHE TYPE: ICD-10-CM

## 2024-12-04 DIAGNOSIS — G47.33 OBSTRUCTIVE SLEEP APNEA: ICD-10-CM

## 2024-12-04 DIAGNOSIS — E78.2 HYPERLIPIDEMIA, MIXED: ICD-10-CM

## 2024-12-04 DIAGNOSIS — E11.9 TYPE 2 DIABETES MELLITUS WITHOUT COMPLICATION, WITHOUT LONG-TERM CURRENT USE OF INSULIN (MULTI): ICD-10-CM

## 2024-12-04 DIAGNOSIS — E55.9 VITAMIN D DEFICIENCY: ICD-10-CM

## 2024-12-04 DIAGNOSIS — I10 ESSENTIAL HYPERTENSION: ICD-10-CM

## 2024-12-04 DIAGNOSIS — R51.9 CHRONIC NONINTRACTABLE HEADACHE, UNSPECIFIED HEADACHE TYPE: ICD-10-CM

## 2024-12-04 LAB
25(OH)D3 SERPL-MCNC: 78 NG/ML (ref 30–100)
ALBUMIN SERPL BCP-MCNC: 4.6 G/DL (ref 3.4–5)
ALP SERPL-CCNC: 116 U/L (ref 33–110)
ALT SERPL W P-5'-P-CCNC: 22 U/L (ref 7–45)
ANION GAP SERPL CALC-SCNC: 15 MMOL/L (ref 10–20)
AST SERPL W P-5'-P-CCNC: 16 U/L (ref 9–39)
BILIRUB SERPL-MCNC: 1 MG/DL (ref 0–1.2)
BUN SERPL-MCNC: 11 MG/DL (ref 6–23)
CALCIUM SERPL-MCNC: 10.1 MG/DL (ref 8.6–10.6)
CHLORIDE SERPL-SCNC: 100 MMOL/L (ref 98–107)
CHOLEST SERPL-MCNC: 136 MG/DL (ref 0–199)
CHOLESTEROL/HDL RATIO: 2.9
CO2 SERPL-SCNC: 30 MMOL/L (ref 21–32)
CREAT SERPL-MCNC: 0.75 MG/DL (ref 0.5–1.05)
CREAT UR-MCNC: 111 MG/DL (ref 20–320)
EGFRCR SERPLBLD CKD-EPI 2021: >90 ML/MIN/1.73M*2
ERYTHROCYTE [DISTWIDTH] IN BLOOD BY AUTOMATED COUNT: 14 % (ref 11.5–14.5)
EST. AVERAGE GLUCOSE BLD GHB EST-MCNC: 134 MG/DL
GLUCOSE SERPL-MCNC: 73 MG/DL (ref 74–99)
HBA1C MFR BLD: 6.3 %
HCT VFR BLD AUTO: 41.7 % (ref 36–46)
HDLC SERPL-MCNC: 46.4 MG/DL
HGB BLD-MCNC: 13.7 G/DL (ref 12–16)
LDLC SERPL CALC-MCNC: 76 MG/DL
MCH RBC QN AUTO: 28.9 PG (ref 26–34)
MCHC RBC AUTO-ENTMCNC: 32.9 G/DL (ref 32–36)
MCV RBC AUTO: 88 FL (ref 80–100)
MICROALBUMIN UR-MCNC: <7 MG/L
MICROALBUMIN/CREAT UR: NORMAL MG/G{CREAT}
NON HDL CHOLESTEROL: 90 MG/DL (ref 0–149)
NRBC BLD-RTO: 0 /100 WBCS (ref 0–0)
PLATELET # BLD AUTO: 464 X10*3/UL (ref 150–450)
POTASSIUM SERPL-SCNC: 3.8 MMOL/L (ref 3.5–5.3)
PROT SERPL-MCNC: 7.9 G/DL (ref 6.4–8.2)
RBC # BLD AUTO: 4.74 X10*6/UL (ref 4–5.2)
SODIUM SERPL-SCNC: 141 MMOL/L (ref 136–145)
TRIGL SERPL-MCNC: 69 MG/DL (ref 0–149)
TSH SERPL-ACNC: 1.05 MIU/L (ref 0.44–3.98)
VLDL: 14 MG/DL (ref 0–40)
WBC # BLD AUTO: 7.1 X10*3/UL (ref 4.4–11.3)

## 2024-12-04 PROCEDURE — 85027 COMPLETE CBC AUTOMATED: CPT

## 2024-12-04 PROCEDURE — 80053 COMPREHEN METABOLIC PANEL: CPT

## 2024-12-04 PROCEDURE — 83036 HEMOGLOBIN GLYCOSYLATED A1C: CPT

## 2024-12-04 PROCEDURE — 82570 ASSAY OF URINE CREATININE: CPT

## 2024-12-04 PROCEDURE — 84443 ASSAY THYROID STIM HORMONE: CPT

## 2024-12-04 PROCEDURE — 82043 UR ALBUMIN QUANTITATIVE: CPT

## 2024-12-04 PROCEDURE — 82306 VITAMIN D 25 HYDROXY: CPT

## 2024-12-04 PROCEDURE — 36415 COLL VENOUS BLD VENIPUNCTURE: CPT

## 2024-12-04 PROCEDURE — 80061 LIPID PANEL: CPT

## 2024-12-05 ENCOUNTER — APPOINTMENT (OUTPATIENT)
Dept: ENDOCRINOLOGY | Facility: CLINIC | Age: 54
End: 2024-12-05
Payer: COMMERCIAL

## 2024-12-06 ENCOUNTER — APPOINTMENT (OUTPATIENT)
Dept: PHARMACY | Facility: HOSPITAL | Age: 54
End: 2024-12-06
Payer: COMMERCIAL

## 2024-12-09 ENCOUNTER — APPOINTMENT (OUTPATIENT)
Dept: PHARMACY | Facility: HOSPITAL | Age: 54
End: 2024-12-09
Payer: COMMERCIAL

## 2024-12-23 ENCOUNTER — APPOINTMENT (OUTPATIENT)
Dept: PHARMACY | Facility: HOSPITAL | Age: 54
End: 2024-12-23
Payer: COMMERCIAL

## 2024-12-23 DIAGNOSIS — R35.0 URINARY FREQUENCY: ICD-10-CM

## 2024-12-23 NOTE — PROGRESS NOTES
Patient ID: Javier Rascon is a 54 y.o. female who presents for Overactive bladder.    Referring Provider: Stacey Vazquez   Pt was referred for cost assistance     Preferred Pharmacy:    [facility]/pharmacy #[id number] - [place], [place] - [address] [address] [facility] AT [address] [facility]  [address] [facility] [address]  Phone: [phone] Fax: [phone]    [facility] - GHAZALA [place] - [facility] AT Portal to [facility]istered [facility]  [facility]  [place] [address]  Phone: [phone] Fax: [phone]    [id number] - [place], [place] - [address] [facility]  [address] [facility] [address]  Phone: [phone] Fax: [phone]     [facility]  [address] [facility] [facility] [address]  [facility] [address]  Phone: [phone] Fax: [phone]    [facility] - [facility] [id number] [address]  Phone: [phone] Fax: [phone]    [facility]  [address], [address]  [facility] [address]  Phone: [phone] Fax: [phone]      Copay assistance:   Do you have copays on your medications? Yes  Do you have trouble affording your current medications? Yes     Patient Assistance Screening (VAF)    Patient verbally reports monthly or yearly income which is less than 400% federal poverty level   Application for program has been submitted for the following medications:   Gemtesa   Patient has been informed that program team will be reaching out to them to discuss necessary documentation, instructed to answer phone/return voicemail.   Patient aware this process may take up to 6 weeks.   If approved medication must be filled through [facility] pharmacy and may be picked up or mailed to patient.       Subjective      Vaginal Symptoms  Vaginal Dryness: no   Dysuria (pain, burning, stinging, or itching with urination): no   Vaginal and/or vulvar irritation/itching: No  "  Recurrent urinary tract infections: Yes, last UTI about two months ago   Lab Results   Component Value Date    ESTRADIOLFRE 0.30 11/10/2021    PROGESTERONE <0.3 11/10/2021    ESTRADIOL 12 11/10/2021    FSH 40.5 01/02/2024       Urinary Symptoms  Medications that may contribute to symptoms:   Losartan/hydrochlorothiazide - dicussed importance of taking in morning; patient confirmed taking in morning  Amlodipine - tolerating without issue   Any history of:   Narrow-angle glaucoma? No  Impaired gastric emptying? No   Urinary retention? No    If diabetes, blood sugar controlled? HbA1c 7.7%  Frequently of bowel movement? Daily   Tobacco use? No   How many protective undergarments are you utilizing daily? No     What medications have been tried/stopped?   Solifenacin - stopped when switched to Gemtesa for better results   Current medication? Gemtesa 75 mg daily   When started? October 31st 2024  Side effects? No   Improvement in symptoms? Yes      Cardiovascular Health  The 10-year ASCVD risk score (Madalyn ENGEL, et al., 2019) is: 17.3%    Values used to calculate the score:      Age: 54 years      Sex: Female      Is Non- : Yes      Diabetic: Yes      Tobacco smoker: No      Systolic Blood Pressure: 158 mmHg      Is BP treated: Yes      HDL Cholesterol: 46.4 mg/dL      Total Cholesterol: 136 mg/dL    Lab Results   Component Value Date    CHOL 136 12/04/2024     Lab Results   Component Value Date    HDL 46.4 12/04/2024     Lab Results   Component Value Date    LDLCALC 76 12/04/2024     Lab Results   Component Value Date    TRIG 69 12/04/2024     No components found for: \"CHOLHDL\"        Blood Sugar Balance  Lab Results   Component Value Date    GLUCOSE 73 (L) 12/04/2024    HGBA1C 6.3 (H) 12/04/2024    HGBA1C 7.7 (H) 07/30/2024    HGBA1C 6.3 (H) 01/02/2024     No results found for: \"LEPTIN\", \"INSULFAST\", \"GLUF\"      Thyroid  Lab Results   Component Value Date    TSH 1.05 12/04/2024       Iron " Status  Lab Results   Component Value Date    IRON 47 10/11/2019    TIBC 344 10/11/2019    FERRITIN 72 10/11/2019        Kidney Function  Lab Results   Component Value Date    GFRF >90 07/23/2023    CREATININE 0.75 12/04/2024       Potassium  Lab Results   Component Value Date    K 3.8 12/04/2024        Vitamin D3  Lab Results   Component Value Date    VITD25 78 12/04/2024       Current Outpatient Medications on File Prior to Visit   Medication Sig Dispense Refill    amLODIPine (Norvasc) 5 mg tablet Take 1 tablet (5 mg) by mouth once daily. 90 tablet 0    atorvastatin (Lipitor) 20 mg tablet Take 1 tablet (20 mg) by mouth once daily. 90 tablet 0    azelastine (Astelin) 137 mcg (0.1 %) nasal spray Administer 2 sprays into each nostril once daily at bedtime. (Patient taking differently: Administer 2 sprays into each nostril once daily at bedtime. As needed) 30 mL 11    cholecalciferol (Vitamin D-3) 25 MCG (1000 UT) tablet Take 1 tablet (25 mcg) by mouth once daily.      dulaglutide (Trulicity) 4.5 mg/0.5 mL pen injector INJECT 4 MG UNDER THE SKIN 1 (ONE) TIME PER WEEK. 4 Pen 5    estradiol (Estrace) 0.01 % (0.1 mg/gram) vaginal cream Apply nightly for 3 weeks, then 3 times per week. 42.5 g 5    fluticasone (Flonase) 50 mcg/actuation nasal spray Administer 2 sprays into each nostril once daily with breakfast. Shake gently. Before first use, prime pump. After use, clean tip and replace cap. 16 g 11    levocetirizine (Xyzal) 5 mg tablet TAKE 1 TABLET (5 MG) BY MOUTH DAILY IN THE EVENING (Patient taking differently: Take 1 tablet (5 mg) by mouth once daily in the evening. As needed) 90 tablet 0    losartan-hydrochlorothiazide (Hyzaar) 100-12.5 mg tablet Take 1 tablet by mouth once daily. 90 tablet 0    metFORMIN (Glucophage) 1,000 mg tablet Take 1 tablet (1,000 mg) by mouth 2 times daily (morning and late afternoon). 100 tablet 3    metoprolol tartrate (Lopressor) 25 mg tablet Take 1 tablet (25 mg) by mouth 2 times a day.  180 tablet 0    naltrexone-bupropion (Contrave) 8-90 mg ER tablet Take 2 tablets by mouth 2 times a day. (Patient not taking: Reported on 11/22/2024) 60 tablet 3    nitrofurantoin (Macrodantin) 50 mg capsule One capsule by mouth as needed with intercourse for UTI prevention 30 capsule 2    pantoprazole (ProtoNix) 20 mg EC tablet Take 1 tablet (20 mg) by mouth once daily in the morning. Take before meals. Do not crush, chew, or split. (Patient taking differently: Take 1 tablet (20 mg) by mouth once daily in the morning. Take before meals. As needed) 90 tablet 0    vibegron (Gemtesa) 75 mg tablet Take 1 tablet (75 mg) by mouth once daily. 30 tablet 0    vibegron (Gemtesa) 75 mg tablet Take 1 tablet (75 mg) by mouth once daily. Do not fill before December 17, 2024. 90 tablet 3     No current facility-administered medications on file prior to visit.        Medication and allergy reconciliation completed     Drug Interactions   No significant drug interactions identified    Assessment/Plan     Patient is experiencing urinary frequency. Since starting the Gemtesa, her frequency has decreased. Gemtesa does require PA from insurance, which was just approved. Given insurance is going to cover medication, no PAP is needed. Will continue Gemtesa through  pharmacy for mail order. Patient agreeable with plan.   Has tried before solifenacin   Patient does not need to apply [facility]    Gemtesa   Discussed MOA: works by activating beta-3 adrenergic receptors in the bladder resulting in relaxation of the detrusor smooth muscle during the urine storage phase, thus increasing bladder capacity.  Provided education on administration and potential side effects including but not limited to hot flashes <2%, constipation/diarrhea <2%, dry mouth <2%, and headache <4%.   Gemtesa (vibegron) starts working almost immediately - within a few days of first taking it, with noticeable improvements in urinary urgency, frequency, and incontinence  noted in clinical trials at 2 weeks which were reported as significant by 12 weeks.    LABS  Lab Results   Component Value Date    GFRF >90 07/23/2023     GFR >90 ml/min as of 7/30/2024      CONTINUE  Gemtesa 75 mg once daily    Follow-up: 6/19/24 at 10 am      Time spent with pt: Total length of time 10 (minutes) of the encounter and more than 50% was spent counseling the patient.      Sharon Odell, PharmD      Continue all meds under the continuation of care with the referring provider and clinical pharmacy team.    Verbal consent to manage patient's drug therapy was obtained from the patient and/or an individual authorized to act on behalf of a patient. They were informed they may decline to participate or withdraw from participation in pharmacy services at any time.

## 2024-12-30 ENCOUNTER — APPOINTMENT (OUTPATIENT)
Dept: PHARMACY | Facility: HOSPITAL | Age: 54
End: 2024-12-30
Payer: COMMERCIAL

## 2024-12-30 DIAGNOSIS — E11.9 TYPE 2 DIABETES MELLITUS WITHOUT COMPLICATION, WITHOUT LONG-TERM CURRENT USE OF INSULIN (MULTI): ICD-10-CM

## 2024-12-30 NOTE — PROGRESS NOTES
Pharmacist Clinic: Diabetes Management  Javier Rascon is a 54 y.o. female was referred to Clinical Pharmacy Team for diabetes management.     Referring Provider: Carri Lemons MD     HISTORY OF PRESENT ILLNESS  Approximate Date of Diagnosis: 5 months ago    On Trulicity, interested in Mounjaro for additional weight loss potential   Also has sleep apnea for which she uses CPAP at night    LAB REVIEW   Glucose (mg/dL)   Date Value   12/04/2024 73 (L)   07/30/2024 129 (H)   01/02/2024 102 (H)     Hemoglobin A1C (%)   Date Value   12/04/2024 6.3 (H)   07/30/2024 7.7 (H)   01/02/2024 6.3 (H)     Bicarbonate (mmol/L)   Date Value   12/04/2024 30   07/30/2024 31   01/02/2024 31     Urea Nitrogen (mg/dL)   Date Value   12/04/2024 11   07/30/2024 11   01/02/2024 8     Creatinine (mg/dL)   Date Value   12/04/2024 0.75   07/30/2024 0.69   01/02/2024 0.68     Lab Results   Component Value Date    HGBA1C 6.3 (H) 12/04/2024    HGBA1C 7.7 (H) 07/30/2024    HGBA1C 6.3 (H) 01/02/2024     Lab Results   Component Value Date    CHOL 136 12/04/2024    CHOL 131 01/02/2024    CHOL 173 11/30/2022     Lab Results   Component Value Date    HDL 46.4 12/04/2024    HDL 44.9 01/02/2024    HDL 40.9 11/30/2022     Lab Results   Component Value Date    LDLCALC 76 12/04/2024    LDLCALC 73 01/02/2024     Lab Results   Component Value Date    TRIG 69 12/04/2024    TRIG 68 01/02/2024    TRIG 118 11/30/2022       DIABETES ASSESSMENT    CURRENT PHARMACOTHERAPY  - Trulicity 4.5 mg weekly    SIDE EFFECTS?  Not noticing weight loss    HISTORICAL PHARMACOTHERAPY  - none discussed    SMBG MEASUREMENTS  Does not check    Patient does not report symptoms of hypoglycemia.   Patient does not report symptoms of hyperglycemia.     Diet:   - eats healthy  - mediterranean diet    Exercise:   - 30 mins every other day     RECOMMENDATIONS/PLAN  Patients diabetes is well controlled with most recent A1c of 6.3% (goal < 7 %).   Repeat A1c after being on  Trulicity therapy, improved from 7.7  Insurance prefers Trulicity GHAZALA MANZANARES for Mounjaro denied  Will attempt insurance formulary exclusion request for Zepbound for additional management of obstructive sleep apnea  Faxed to insurance  Education  Counseled patient on MOA, expectations, side effects, duration of therapy, contraindications, administration, and monitoring parameters  Answered all patient questions and concerns    Clinical Pharmacist follow up: 1/13/25 240 pm    Brionna Forrest PharmD  Clinical Pharmacy Specialist, Primary Care   [phone]    Continue all meds under the continuation of care with the referring provider and clinical pharmacy team.    Verbal consent to manage patient's drug therapy was obtained from [the patient or an individual authorized to act on behalf of a patient]. They were informed they may decline to participate or withdraw from participation in pharmacy services at any time.

## 2024-12-31 ENCOUNTER — APPOINTMENT (OUTPATIENT)
Dept: RADIOLOGY | Facility: CLINIC | Age: 54
End: 2024-12-31
Payer: COMMERCIAL

## 2025-01-06 ENCOUNTER — APPOINTMENT (OUTPATIENT)
Dept: RADIOLOGY | Facility: CLINIC | Age: 55
End: 2025-01-06
Payer: COMMERCIAL

## 2025-01-06 PROCEDURE — RXMED WILLOW AMBULATORY MEDICATION CHARGE

## 2025-01-08 ENCOUNTER — PHARMACY VISIT (OUTPATIENT)
Dept: PHARMACY | Facility: CLINIC | Age: 55
End: 2025-01-08
Payer: MEDICAID

## 2025-01-08 ENCOUNTER — APPOINTMENT (OUTPATIENT)
Dept: ENDOCRINOLOGY | Facility: CLINIC | Age: 55
End: 2025-01-08
Payer: COMMERCIAL

## 2025-01-09 ENCOUNTER — APPOINTMENT (OUTPATIENT)
Dept: ENDOCRINOLOGY | Facility: CLINIC | Age: 55
End: 2025-01-09
Payer: COMMERCIAL

## 2025-01-13 ENCOUNTER — APPOINTMENT (OUTPATIENT)
Dept: PHARMACY | Facility: HOSPITAL | Age: 55
End: 2025-01-13
Payer: COMMERCIAL

## 2025-01-13 DIAGNOSIS — E11.9 TYPE 2 DIABETES MELLITUS WITHOUT COMPLICATION, WITHOUT LONG-TERM CURRENT USE OF INSULIN (MULTI): ICD-10-CM

## 2025-01-13 NOTE — PROGRESS NOTES
Pharmacist Clinic: Diabetes Management  Javier Rascon is a 54 y.o. female was referred to Clinical Pharmacy Team for diabetes management.     Referring Provider: Carri Lemons MD     HISTORY OF PRESENT ILLNESS  Approximate Date of Diagnosis: 5 months ago    On maximum dose of Trulicity, PA for Mounjaro and Zepbound denied    LAB REVIEW   Glucose (mg/dL)   Date Value   12/04/2024 73 (L)   07/30/2024 129 (H)   01/02/2024 102 (H)     Hemoglobin A1C (%)   Date Value   12/04/2024 6.3 (H)   07/30/2024 7.7 (H)   01/02/2024 6.3 (H)     Bicarbonate (mmol/L)   Date Value   12/04/2024 30   07/30/2024 31   01/02/2024 31     Urea Nitrogen (mg/dL)   Date Value   12/04/2024 11   07/30/2024 11   01/02/2024 8     Creatinine (mg/dL)   Date Value   12/04/2024 0.75   07/30/2024 0.69   01/02/2024 0.68     Lab Results   Component Value Date    HGBA1C 6.3 (H) 12/04/2024    HGBA1C 7.7 (H) 07/30/2024    HGBA1C 6.3 (H) 01/02/2024     Lab Results   Component Value Date    CHOL 136 12/04/2024    CHOL 131 01/02/2024    CHOL 173 11/30/2022     Lab Results   Component Value Date    HDL 46.4 12/04/2024    HDL 44.9 01/02/2024    HDL 40.9 11/30/2022     Lab Results   Component Value Date    LDLCALC 76 12/04/2024    LDLCALC 73 01/02/2024     Lab Results   Component Value Date    TRIG 69 12/04/2024    TRIG 68 01/02/2024    TRIG 118 11/30/2022       DIABETES ASSESSMENT    CURRENT PHARMACOTHERAPY  - Trulicity 4.5 mg weekly    SIDE EFFECTS?  Not noticing weight loss    HISTORICAL PHARMACOTHERAPY  - none discussed    SMBG MEASUREMENTS  Does not check    Patient does not report symptoms of hypoglycemia.   Patient does not report symptoms of hyperglycemia.     Diet:   - eats healthy  - mediterranean diet    Exercise:   - 30 mins every other day     RECOMMENDATIONS/PLAN  Patients diabetes is well controlled with most recent A1c of 6.3% (goal < 7 %).   Prior auth request for Zepbound denied  Continue Trulicity 4.5 mg weekly  Patient was interested  in dose increase but this is maximum dose  Referred patient back to PCP for additional medication options for weight management  Has been prescribed Contrave in the past, discussed this medication, MOA, and weight loss potential  Education  Counseled patient on MOA, expectations, side effects, duration of therapy, contraindications, administration, and monitoring parameters  Answered all patient questions and concerns    Clinical Pharmacist follow up: not scheduled     Brionna Forrest PharmD  Clinical Pharmacy Specialist, Primary Care   [phone]    Continue all meds under the continuation of care with the referring provider and clinical pharmacy team.    Verbal consent to manage patient's drug therapy was obtained from [the patient or an individual authorized to act on behalf of a patient]. They were informed they may decline to participate or withdraw from participation in pharmacy services at any time.

## 2025-01-28 ENCOUNTER — APPOINTMENT (OUTPATIENT)
Dept: PRIMARY CARE | Facility: CLINIC | Age: 55
End: 2025-01-28
Payer: COMMERCIAL

## 2025-01-28 DIAGNOSIS — E66.01 CLASS 2 SEVERE OBESITY DUE TO EXCESS CALORIES WITH SERIOUS COMORBIDITY AND BODY MASS INDEX (BMI) OF 36.0 TO 36.9 IN ADULT: ICD-10-CM

## 2025-01-28 DIAGNOSIS — E11.9 TYPE 2 DIABETES MELLITUS WITHOUT COMPLICATION, WITHOUT LONG-TERM CURRENT USE OF INSULIN (MULTI): Primary | ICD-10-CM

## 2025-01-28 DIAGNOSIS — G47.33 OBSTRUCTIVE SLEEP APNEA: ICD-10-CM

## 2025-01-28 DIAGNOSIS — E66.812 CLASS 2 SEVERE OBESITY DUE TO EXCESS CALORIES WITH SERIOUS COMORBIDITY AND BODY MASS INDEX (BMI) OF 36.0 TO 36.9 IN ADULT: ICD-10-CM

## 2025-01-28 PROCEDURE — 99214 OFFICE O/P EST MOD 30 MIN: CPT | Performed by: INTERNAL MEDICINE

## 2025-01-28 RX ORDER — TIRZEPATIDE 2.5 MG/.5ML
2.5 INJECTION, SOLUTION SUBCUTANEOUS
Qty: 2 ML | Refills: 0 | Status: SHIPPED | OUTPATIENT
Start: 2025-01-28 | End: 2025-02-19

## 2025-01-28 ASSESSMENT — ENCOUNTER SYMPTOMS
WEIGHT LOSS: 0
POLYDIPSIA: 0
POLYPHAGIA: 0
BLURRED VISION: 0
VISUAL CHANGE: 0
WEAKNESS: 0
FATIGUE: 0

## 2025-01-28 NOTE — PROGRESS NOTES
Chief Complaint:   Chief Complaint   Patient presents with    Diabetes    Obesity        Javier Rascon is a 54 y.o. year old female who presents to the clinic for a virtual visit.  Diabetes  She presents for her follow-up diabetic visit. She has type 2 diabetes mellitus. Her disease course has been improving. There are no hypoglycemic associated symptoms. Pertinent negatives for diabetes include no blurred vision, no chest pain, no fatigue, no foot paresthesias, no foot ulcerations, no polydipsia, no polyphagia, no polyuria, no visual change, no weakness and no weight loss. There are no hypoglycemic complications. There are no diabetic complications. Risk factors for coronary artery disease include diabetes mellitus, dyslipidemia and hypertension.        Past Medical History   Past Medical History:   Diagnosis Date    Body mass index (BMI) 35.0-35.9, adult 2021    Body mass index (BMI) of 35.0 to 35.9    Body mass index (BMI) 37.0-37.9, adult 2020    BMI 37.0-37.9, adult    Personal history of other diseases of the circulatory system     History of hypertension    Personal history of other mental and behavioral disorders 07/10/2018    History of depression      Past Surgical History:   Past Surgical History:   Procedure Laterality Date     SECTION, CLASSIC  10/01/2015     Section    OTHER SURGICAL HISTORY  10/01/2015    Open Fracture Reduction    OTHER SURGICAL HISTORY  2022    Total hysterectomy abdominal     Family History:   Family History   Problem Relation Name Age of Onset    Hypertension Mother      Hypertension Father      Diabetes Father      Heart attack Maternal Grandmother      Diabetes Half-Sister Paternal     Hypertension Half-Sister Maternal     Diabetes Half-Brother Paternal      Social History:   Tobacco Use: Medium Risk (2024)    Patient History     Smoking Tobacco Use: Former     Smokeless Tobacco Use: Never     Passive Exposure: Past      Social History  "    Substance and Sexual Activity   Alcohol Use Not Currently    Comment: Rarely        Allergies:   Allergies   Allergen Reactions    Sulfamethoxazole-Trimethoprim Itching        ROS   Review of Systems   Constitutional:  Negative for fatigue and weight loss.   Eyes:  Negative for blurred vision.   Cardiovascular:  Negative for chest pain.   Endocrine: Negative for polydipsia, polyphagia and polyuria.   Neurological:  Negative for weakness.        Objective   There were no vitals filed for this visit.   BMI Readings from Last 15 Encounters:   11/19/24 36.22 kg/m²   10/31/24 36.22 kg/m²   10/03/24 37.25 kg/m²   09/25/24 37.25 kg/m²   08/12/24 37.76 kg/m²   05/31/24 37.76 kg/m²   05/21/24 37.16 kg/m²   03/01/24 35.53 kg/m²   01/02/24 37.42 kg/m²   04/20/23 37.93 kg/m²   12/20/22 37.93 kg/m²   12/15/22 37.83 kg/m²   11/01/22 38.00 kg/m²   10/18/22 37.76 kg/m²   05/03/22 36.73 kg/m²      95.7 kg (211 lb) (11/19/2024  9:52 AM)      Physical Exam  Physical Exam  Neurological:      Mental Status: She is alert.   Psychiatric:         Mood and Affect: Mood normal.          Labs:  CBC:  Recent Labs     12/04/24  1336 01/02/24  0949 07/23/23  0713   WBC 7.1 5.6 7.3   HGB 13.7 13.2 12.9   HCT 41.7 40.3 39.6   * 402 403   MCV 88 88 90     CMP:  Recent Labs     12/04/24  1336 07/30/24  1023 01/02/24  0949    139 141   K 3.8 3.8 3.7    97* 101   CO2 30 31 31   ANIONGAP 15 15 13   BUN 11 11 8   CREATININE 0.75 0.69 0.68   EGFR >90 >90 >90   GLUCOSE 73* 129* 102*     Recent Labs     12/04/24  1336 07/30/24  1023 01/02/24  0949 03/05/23  0701   ALBUMIN 4.6 4.8 4.8 4.1   ALKPHOS 116* 124* 128* 109   ALT 22 43 25 95*   AST 16 31 21 101*   BILITOT 1.0 1.2 1.2 1.0   LIPASE  --   --   --  22     Calcium/Phos:   Lab Results   Component Value Date    CALCIUM 10.1 12/04/2024      COAG: No results for input(s): \"INR\", \"DDIMERVTE\" in the last 02411 hours.  CRP: No results found for: \"CRP\"   [unfilled]   ENDO:  Recent " Labs     12/04/24  1336 07/30/24  1023 01/02/24  0949 11/30/22  0920 05/06/22  0718 08/11/21  1520   TSH 1.05  --  1.07 0.66  --  0.58   HGBA1C 6.3* 7.7* 6.3* 7.3*   < > 5.6    < > = values in this interval not displayed.      CARDIAC:   Recent Labs     03/05/23  0701   TROPHS 4     Recent Labs     12/04/24  1336 01/02/24  0949 11/30/22  0920 05/06/22  0718 08/11/21  1520   CHOL 136 131 173 147 163   LDLF  --   --  109* 94 104*   LDLCALC 76 73  --   --   --    HDL 46.4 44.9 40.9 41.4 40.9   TRIG 69 68 118 58 93     No data recorded    Current Medications:  Current Outpatient Medications   Medication Sig Dispense Refill    amLODIPine (Norvasc) 5 mg tablet Take 1 tablet (5 mg) by mouth once daily. 90 tablet 0    atorvastatin (Lipitor) 20 mg tablet Take 1 tablet (20 mg) by mouth once daily. 90 tablet 0    azelastine (Astelin) 137 mcg (0.1 %) nasal spray Administer 2 sprays into each nostril once daily at bedtime. (Patient taking differently: Administer 2 sprays into each nostril once daily at bedtime. As needed) 30 mL 11    cholecalciferol (Vitamin D-3) 25 MCG (1000 UT) tablet Take 1 tablet (25 mcg) by mouth once daily.      dulaglutide (Trulicity) 4.5 mg/0.5 mL pen injector INJECT 4 MG UNDER THE SKIN 1 (ONE) TIME PER WEEK. 4 Pen 5    estradiol (Estrace) 0.01 % (0.1 mg/gram) vaginal cream Apply nightly for 3 weeks, then 3 times per week. 42.5 g 5    fluticasone (Flonase) 50 mcg/actuation nasal spray Administer 2 sprays into each nostril once daily with breakfast. Shake gently. Before first use, prime pump. After use, clean tip and replace cap. 16 g 11    levocetirizine (Xyzal) 5 mg tablet TAKE 1 TABLET (5 MG) BY MOUTH DAILY IN THE EVENING (Patient taking differently: Take 1 tablet (5 mg) by mouth once daily in the evening. As needed) 90 tablet 0    losartan-hydrochlorothiazide (Hyzaar) 100-12.5 mg tablet Take 1 tablet by mouth once daily. 90 tablet 0    metFORMIN (Glucophage) 1,000 mg tablet Take 1 tablet (1,000 mg) by  mouth 2 times daily (morning and late afternoon). 100 tablet 3    metoprolol tartrate (Lopressor) 25 mg tablet Take 1 tablet (25 mg) by mouth 2 times a day. 180 tablet 0    naltrexone-bupropion (Contrave) 8-90 mg ER tablet Take 2 tablets by mouth 2 times a day. (Patient not taking: Reported on 11/22/2024) 60 tablet 3    nitrofurantoin (Macrodantin) 50 mg capsule One capsule by mouth as needed with intercourse for UTI prevention 30 capsule 2    pantoprazole (ProtoNix) 20 mg EC tablet Take 1 tablet (20 mg) by mouth once daily in the morning. Take before meals. Do not crush, chew, or split. (Patient taking differently: Take 1 tablet (20 mg) by mouth once daily in the morning. Take before meals. As needed) 90 tablet 0    tirzepatide, weight loss, (Zepbound) 2.5 mg/0.5 mL solution Inject 2.5 mg under the skin every 7 days for 4 doses. 2 mL 0    vibegron (Gemtesa) 75 mg tablet Take 1 tablet (75 mg) by mouth once daily. 30 tablet 0    vibegron (Gemtesa) 75 mg tablet Take 1 tablet (75 mg) by mouth once daily. Do not fill before December 17, 2024. 90 tablet 3     No current facility-administered medications for this visit.       Assessment and Plan  Javier was seen today for diabetes and obesity.  Diagnoses and all orders for this visit:  Type 2 diabetes mellitus without complication, without long-term current use of insulin (Multi) (Primary)  -     tirzepatide, weight loss, (Zepbound) 2.5 mg/0.5 mL solution; Inject 2.5 mg under the skin every 7 days for 4 doses.  Obstructive sleep apnea  -     tirzepatide, weight loss, (Zepbound) 2.5 mg/0.5 mL solution; Inject 2.5 mg under the skin every 7 days for 4 doses.  Class 2 severe obesity due to excess calories with serious comorbidity and body mass index (BMI) of 36.0 to 36.9 in adult  -     tirzepatide, weight loss, (Zepbound) 2.5 mg/0.5 mL solution; Inject 2.5 mg under the skin every 7 days for 4 doses.   Mounjaro didn't get covered  Zepbound din't get covered  Discussed other  options, such as [facility] for Zepbound  Rx sent to [facility]  Awaiting them to contact the patient and set up delivery        An interactive audio and video telecommunication system which permits real time communications between the patient (at the originating site) and provider (at the distant site) was utilized to provide this telehealth service.    Verbal consent was requested and obtained from the patient on the day of encounter.  This is a virtual visit using HIPAA compliant video platform. It required patient-provider interaction for the medical decision making as documented.     I have communicated my name and active licensure. The patient's identity and physical location were verified at the time of this visit.   Either the patient or their legal representative has been informed of the risks and benefits of -- and alternatives to -- treatment through a remote evaluation and consents to proceed with the evaluation remotely.      Immunizations:  Immunization History   Administered Date(s) Administered    Pfizer Purple Cap SARS-CoV-2 03/16/2021, 04/06/2021, 01/06/2022    Tdap vaccine, age 7 year and older (BOOSTRIX, ADACEL) 10/01/2015, 03/07/2018

## 2025-01-30 ENCOUNTER — APPOINTMENT (OUTPATIENT)
Dept: RADIOLOGY | Facility: CLINIC | Age: 55
End: 2025-01-30
Payer: COMMERCIAL

## 2025-01-31 ENCOUNTER — HOSPITAL ENCOUNTER (OUTPATIENT)
Dept: RADIOLOGY | Facility: CLINIC | Age: 55
Discharge: HOME | End: 2025-01-31
Payer: COMMERCIAL

## 2025-01-31 DIAGNOSIS — I10 ESSENTIAL HYPERTENSION: ICD-10-CM

## 2025-01-31 DIAGNOSIS — E11.9 TYPE 2 DIABETES MELLITUS WITHOUT COMPLICATION, WITHOUT LONG-TERM CURRENT USE OF INSULIN (MULTI): ICD-10-CM

## 2025-01-31 DIAGNOSIS — E78.2 HYPERLIPIDEMIA, MIXED: ICD-10-CM

## 2025-01-31 DIAGNOSIS — Z00.00 ANNUAL PHYSICAL EXAM: ICD-10-CM

## 2025-01-31 PROCEDURE — 77067 SCR MAMMO BI INCL CAD: CPT

## 2025-02-06 ENCOUNTER — APPOINTMENT (OUTPATIENT)
Dept: UROLOGY | Facility: CLINIC | Age: 55
End: 2025-02-06
Payer: COMMERCIAL

## 2025-02-11 DIAGNOSIS — E66.812 CLASS 2 OBESITY: ICD-10-CM

## 2025-02-13 ENCOUNTER — OFFICE VISIT (OUTPATIENT)
Dept: UROLOGY | Facility: CLINIC | Age: 55
End: 2025-02-13
Payer: COMMERCIAL

## 2025-02-13 VITALS — BODY MASS INDEX: 36.02 KG/M2 | TEMPERATURE: 97.7 F | HEART RATE: 71 BPM | HEIGHT: 64 IN | WEIGHT: 211 LBS

## 2025-02-13 DIAGNOSIS — N39.0 RECURRENT UTI: ICD-10-CM

## 2025-02-13 DIAGNOSIS — N32.89 BLADDER SPASMS: ICD-10-CM

## 2025-02-13 DIAGNOSIS — R35.0 URINARY FREQUENCY: Primary | ICD-10-CM

## 2025-02-13 DIAGNOSIS — N95.8 GENITOURINARY SYNDROME OF MENOPAUSE: ICD-10-CM

## 2025-02-13 LAB
POC APPEARANCE, URINE: CLEAR
POC BILIRUBIN, URINE: NEGATIVE
POC BLOOD, URINE: ABNORMAL
POC COLOR, URINE: YELLOW
POC GLUCOSE, URINE: NEGATIVE MG/DL
POC KETONES, URINE: NEGATIVE MG/DL
POC LEUKOCYTES, URINE: NEGATIVE
POC NITRITE,URINE: NEGATIVE
POC PH, URINE: 7 PH
POC PROTEIN, URINE: ABNORMAL MG/DL
POC SPECIFIC GRAVITY, URINE: 1.02
POC UROBILINOGEN, URINE: 0.2 EU/DL

## 2025-02-13 PROCEDURE — 81003 URINALYSIS AUTO W/O SCOPE: CPT | Performed by: NURSE PRACTITIONER

## 2025-02-13 PROCEDURE — 3008F BODY MASS INDEX DOCD: CPT | Performed by: NURSE PRACTITIONER

## 2025-02-13 PROCEDURE — 1036F TOBACCO NON-USER: CPT | Performed by: NURSE PRACTITIONER

## 2025-02-13 PROCEDURE — 51798 US URINE CAPACITY MEASURE: CPT | Performed by: NURSE PRACTITIONER

## 2025-02-13 PROCEDURE — 99213 OFFICE O/P EST LOW 20 MIN: CPT | Performed by: NURSE PRACTITIONER

## 2025-02-13 NOTE — PATIENT INSTRUCTIONS
Plan:  Continue Gemtesa given,   pharmacy assistance program, may need PA  Has taken solifenacin in past;     Macrodantin 50 mg as needed with intercourse    Continue Dmanose  Discussed vaginal estrogen, will message Sharon from pharmacy and see if she can send in vagifem instead as estrogen cream manpreet    Nurse line [phone]

## 2025-02-13 NOTE — PROGRESS NOTES
"02/13/25   [id number]    Follow up med response     Subjective      HPI Javier Rascon is a 54 y.o. female who presents follow up med response; Hx frequent UTIs, low desire; last seen 10/31/24;    So happy with gemtesa 75 mg daily, frequency improved, no leakage, nocturia better, may go all night; no side effects, working with  clinical pharmacy program and happy to have delivered; % better today; no concerns;     No UTIs; using Dmanose, macrobid with intimacy, has been good so far;     Desire is a lot better now, no more needed    Straight cath urine October 3,2024 to eval UA dipped small heme, rbc 0-1/hpf normal range;     12/4/24 creatinine 0.75, GFR > 90    UA small heme, micro 1 rbf/hpf on 4/20/23  UA w reflex micro rbc 0-3/hpf on 10/16/24    CT abd/pel w IV 3/5/23 Tiny nonobstructing calculus in the left upper pole. Otherwise   unremarkable kidneys without hydronephrosis. Bladder Nondistended.       PMH, PSH, SH, FH reviewed.  PMH: HTN, OAB,   PSH: hysterectomy;   FH: Hodkin's Lymphoma, DM  SH: non smoker, customer service    Objective     Pulse 71   Temp 36.5 °C (97.7 °F)   Ht 1.626 m (5' 4\")   Wt 95.7 kg (211 lb)   BMI 36.22 kg/m²       no prolapse last viist, normal tone pelvic floor, neg CST    General: Appears comfortable and in no apparent distress, well nourished  Head: Normocephalic, atraumatic  Neck: trachea midline  Respiratory: respirations unlabored, no wheezes, and no use of accessory muscles  Cardiovascular: at rest no dyspnea, well perfused  Skin: no visible rashes or lesions  Neurologic: grossly intact, oriented to person, place, and time  Psychiatric: mood and affect appropriate  Musculoskeletal: in chair for appt. no difficulty w upper body movement    Assessment/Plan   Problem List Items Addressed This Visit          Genitourinary and Reproductive    Urinary frequency - Primary    Relevant Orders    POCT UA Automated manually resulted (Completed)    Post-Void Residual " (Completed)     Other Visit Diagnoses       Recurrent UTI        Genitourinary syndrome of menopause        Bladder spasms                Orders Placed This Encounter   Procedures    Post-Void Residual    POCT UA Automated manually resulted     Order Specific Question:   Release result to Berg     Answer:   Immediate [1]      Continue Gemtesa given,   pharmacy assistance program, may need PA  Has taken solifenacin in past;     Macrodantin 50 mg as needed with intercourse    Continue Dmanose  Discussed vaginal estrogen, will message Sharon from pharmacy and see if she can send in vagifem instead as estrogen cream manpreet    Nurse line [phone]        6 mos Stacey Vazquez, APRN-CNP  Lab Results   Component Value Date    GLUCOSE 73 (L) 12/04/2024    CALCIUM 10.1 12/04/2024     12/04/2024    K 3.8 12/04/2024    CO2 30 12/04/2024     12/04/2024    BUN 11 12/04/2024    CREATININE 0.75 12/04/2024

## 2025-02-17 ENCOUNTER — TELEPHONE (OUTPATIENT)
Dept: PRIMARY CARE | Facility: CLINIC | Age: 55
End: 2025-02-17
Payer: COMMERCIAL

## 2025-02-17 DIAGNOSIS — G47.33 OBSTRUCTIVE SLEEP APNEA: ICD-10-CM

## 2025-02-17 DIAGNOSIS — E11.9 TYPE 2 DIABETES MELLITUS WITHOUT COMPLICATION, WITHOUT LONG-TERM CURRENT USE OF INSULIN (MULTI): ICD-10-CM

## 2025-02-17 DIAGNOSIS — E66.01 CLASS 2 SEVERE OBESITY DUE TO EXCESS CALORIES WITH SERIOUS COMORBIDITY AND BODY MASS INDEX (BMI) OF 36.0 TO 36.9 IN ADULT: ICD-10-CM

## 2025-02-17 DIAGNOSIS — E66.812 CLASS 2 SEVERE OBESITY DUE TO EXCESS CALORIES WITH SERIOUS COMORBIDITY AND BODY MASS INDEX (BMI) OF 36.0 TO 36.9 IN ADULT: ICD-10-CM

## 2025-02-17 NOTE — TELEPHONE ENCOUNTER
Has an appointment tomorrow wants to change to a virtual.   Wanted to see if you need to see her in office or if a virtual was fine

## 2025-02-18 ENCOUNTER — APPOINTMENT (OUTPATIENT)
Dept: PRIMARY CARE | Facility: CLINIC | Age: 55
End: 2025-02-18
Payer: COMMERCIAL

## 2025-02-18 DIAGNOSIS — E66.812 CLASS 2 SEVERE OBESITY DUE TO EXCESS CALORIES WITH SERIOUS COMORBIDITY AND BODY MASS INDEX (BMI) OF 36.0 TO 36.9 IN ADULT: ICD-10-CM

## 2025-02-18 DIAGNOSIS — E11.9 CONTROLLED TYPE 2 DIABETES MELLITUS WITHOUT COMPLICATION, WITHOUT LONG-TERM CURRENT USE OF INSULIN (MULTI): ICD-10-CM

## 2025-02-18 DIAGNOSIS — E78.2 HYPERLIPIDEMIA, MIXED: ICD-10-CM

## 2025-02-18 DIAGNOSIS — E66.01 CLASS 2 SEVERE OBESITY DUE TO EXCESS CALORIES WITH SERIOUS COMORBIDITY AND BODY MASS INDEX (BMI) OF 36.0 TO 36.9 IN ADULT: ICD-10-CM

## 2025-02-18 DIAGNOSIS — I10 ESSENTIAL HYPERTENSION: ICD-10-CM

## 2025-02-18 DIAGNOSIS — E11.9 TYPE 2 DIABETES MELLITUS WITHOUT COMPLICATION, WITHOUT LONG-TERM CURRENT USE OF INSULIN (MULTI): ICD-10-CM

## 2025-02-18 DIAGNOSIS — I10 HYPERTENSION, UNSPECIFIED TYPE: ICD-10-CM

## 2025-02-18 PROCEDURE — 99214 OFFICE O/P EST MOD 30 MIN: CPT | Performed by: INTERNAL MEDICINE

## 2025-02-18 RX ORDER — METOPROLOL TARTRATE 25 MG/1
25 TABLET, FILM COATED ORAL 2 TIMES DAILY
Qty: 180 TABLET | Refills: 0 | Status: SHIPPED | OUTPATIENT
Start: 2025-02-18

## 2025-02-18 RX ORDER — METFORMIN HYDROCHLORIDE 1000 MG/1
1000 TABLET ORAL
Qty: 60 TABLET | Refills: 2 | Status: SHIPPED | OUTPATIENT
Start: 2025-02-18 | End: 2025-05-19

## 2025-02-18 RX ORDER — ATORVASTATIN CALCIUM 20 MG/1
20 TABLET, FILM COATED ORAL DAILY
Qty: 90 TABLET | Refills: 0 | Status: SHIPPED | OUTPATIENT
Start: 2025-02-18

## 2025-02-18 RX ORDER — TIRZEPATIDE 2.5 MG/.5ML
INJECTION, SOLUTION SUBCUTANEOUS
Qty: 2 ML | Refills: 0 | Status: SHIPPED | OUTPATIENT
Start: 2025-02-18

## 2025-02-18 RX ORDER — AMLODIPINE BESYLATE 5 MG/1
5 TABLET ORAL DAILY
Qty: 90 TABLET | Refills: 0 | Status: SHIPPED | OUTPATIENT
Start: 2025-02-18

## 2025-02-18 RX ORDER — DULAGLUTIDE 4.5 MG/.5ML
4 INJECTION, SOLUTION SUBCUTANEOUS
Qty: 4 PEN | Refills: 3 | Status: SHIPPED | OUTPATIENT
Start: 2025-02-18 | End: 2025-02-18

## 2025-02-18 RX ORDER — LOSARTAN POTASSIUM AND HYDROCHLOROTHIAZIDE 12.5; 1 MG/1; MG/1
1 TABLET ORAL DAILY
Qty: 90 TABLET | Refills: 0 | Status: SHIPPED | OUTPATIENT
Start: 2025-02-18 | End: 2025-05-19

## 2025-02-18 RX ORDER — DULAGLUTIDE 4.5 MG/.5ML
4 INJECTION, SOLUTION SUBCUTANEOUS
Qty: 2 ML | Refills: 3 | Status: SHIPPED | OUTPATIENT
Start: 2025-02-18

## 2025-02-18 ASSESSMENT — ENCOUNTER SYMPTOMS
CHILLS: 0
DYSURIA: 0
BLOOD IN STOOL: 0
COUGH: 0
SLEEP DISTURBANCE: 0
ABDOMINAL PAIN: 0
PALPITATIONS: 0
SHORTNESS OF BREATH: 0
VOMITING: 0
DIZZINESS: 0
DIARRHEA: 0
FEVER: 0
NAUSEA: 0
DIFFICULTY URINATING: 0

## 2025-02-18 NOTE — PROGRESS NOTES
Subjective   Patient ID: Javier Rascon is a 54 y.o. female who presents for a follow up visit and refills.     Javier Rascon, a 54-year-old female with past medical history of hypertension, hyperlipidemia, diabetes mellitus type 2, recurrent UTIs, overactive bladder, rhinitis, and obesity presented to the clinic via virtual visit for a follow-up and refills for her medication.  Patient feels well overall denies any acute concerns or new complaints.  No recent hospitalization or illness.  Feels safe at home, no depressed mood, good sleep and appetite.  She denies any fever chills sweats chest pain shortness of breath nausea vomiting diarrhea blood in the stools or leg swelling.  Currently on Trulicity 4.5 mg injection.  She did have Zepbound previously but did not get approved by insurance.  She does follow with clinical pharmacy to help her with her medication/affordability.    Review of Systems   Constitutional:  Negative for chills and fever.   Respiratory:  Negative for cough and shortness of breath.    Cardiovascular:  Negative for chest pain, palpitations and leg swelling.   Gastrointestinal:  Negative for abdominal pain, blood in stool, diarrhea, nausea and vomiting.   Genitourinary:  Negative for difficulty urinating and dysuria.   Neurological:  Negative for dizziness.   Psychiatric/Behavioral:  Negative for sleep disturbance.        Objective   Physical Exam  Constitutional:       General: She is not in acute distress.     Appearance: She is not ill-appearing.   Pulmonary:      Effort: Pulmonary effort is normal.   Neurological:      Mental Status: She is alert and oriented to person, place, and time.   Psychiatric:         Mood and Affect: Mood normal.         Behavior: Behavior normal.         Thought Content: Thought content normal.         Judgment: Judgment normal.         Assessment/Plan   Problem List Items Addressed This Visit       Class 2 severe obesity with serious comorbidity and body mass  index (BMI) of 36.0 to 36.9 in adult    Relevant Medications    metFORMIN (Glucophage) 1,000 mg tablet    Diabetes (Multi)    Relevant Medications    amLODIPine (Norvasc) 5 mg tablet    metoprolol tartrate (Lopressor) 25 mg tablet    atorvastatin (Lipitor) 20 mg tablet    Essential hypertension    Relevant Medications    amLODIPine (Norvasc) 5 mg tablet    losartan-hydrochlorothiazide (Hyzaar) 100-12.5 mg tablet    metoprolol tartrate (Lopressor) 25 mg tablet    atorvastatin (Lipitor) 20 mg tablet    Hyperlipidemia, mixed    Relevant Medications    amLODIPine (Norvasc) 5 mg tablet    metoprolol tartrate (Lopressor) 25 mg tablet    atorvastatin (Lipitor) 20 mg tablet     Other Visit Diagnoses       Hypertension, unspecified type        Relevant Medications    amLODIPine (Norvasc) 5 mg tablet    metoprolol tartrate (Lopressor) 25 mg tablet    atorvastatin (Lipitor) 20 mg tablet    Controlled type 2 diabetes mellitus without complication, without long-term current use of insulin (Multi)        Relevant Medications    dulaglutide (Trulicity) 4.5 mg/0.5 mL pen injector               Javier Rascon, a 54-year-old female with past medical history of hypertension, hyperlipidemia, diabetes mellitus type 2, recurrent UTIs, overactive bladder, rhinitis, and obesity presented to the clinic via virtual visit for a follow-up and refills for her medication.  Patient feels well overall denies any acute concerns or new complaints.  No recent hospitalization or illness.  Feels safe at home, no depressed mood, good sleep and appetite.  She denies any fever chills sweats chest pain shortness of breath nausea vomiting diarrhea blood in the stools or leg swelling.  Currently on Trulicity 4.5 mg injection.  She did have Zepbound previously but did not get approved by insurance.  She does follow with clinical pharmacy to help her with her medication/affordability.      Hypertension  -Advised to check blood pressure at home  -Discussed  lifestyle modification including diet and exercise  -Continue amlodipine, metoprolol tartrate, and lisinopril hydrochlorothiazide  -No significant protein leak concern back in December 2024    Hyperlipidemia  -Lipid panel in December 24lipid panel reviewed/ improved back in December 2024  -Continue statin therapy    Type 2 diabetes  -A1c 6.3 back in December 2024; significantly improved  -Continue metformin and Trulicity 4.5 mg once a week  -Following with clinical pharmacy regarding her medication  -Discussed lifestyle modification including diet and exercise  -Discussed eye and foot exams yearly    Obesity  -Discussed lifestyle modification including diet and exercise  -Currently on Trulicity 4.5 mg once weekly  -Following with clinical pharmacy    Rhinitis  -Follows with allergy and immunology  -On Flonase and azelastine    OAB  -Follows with Urology     HM   =Patient deferred immunization; discussed annual flu, tetanus, shingles, and pna   Mammogram UTD repeat in January 2026   Colonoscopy UTD repeat in January 2033   Dexa not indicated       RTC in June; recheck A1C

## 2025-02-18 NOTE — PROGRESS NOTES
I reviewed the resident/fellow's documentation and discussed the patient with the resident/fellow. I agree with the resident/fellow's medical decision making as documented in the note.  As the attending physician,  I reviewed the relevant imaging studies and available reports. I also discussed the differential diagnosis and all of the proposed management plans with the resident.  An interactive audio and video telecommunication system which permits real time communications between the patient (at the originating site) and provider (at the distant site) was utilized to provide this telehealth service.    Verbal consent was requested and obtained from the patient on the day of encounter.  This is a virtual visit using HIPAA compliant video platform. It required patient-provider interaction for the medical decision making as documented.       Carri Lemons MD

## 2025-03-04 DIAGNOSIS — N95.8 GENITOURINARY SYNDROME OF MENOPAUSE: Primary | ICD-10-CM

## 2025-03-04 DIAGNOSIS — N39.0 RECURRENT UTI: ICD-10-CM

## 2025-03-04 RX ORDER — ESTRADIOL 10 UG/1
10 TABLET, FILM COATED VAGINAL 2 TIMES WEEKLY
Qty: 24 TABLET | Refills: 3 | Status: SHIPPED | OUTPATIENT
Start: 2025-03-06 | End: 2026-03-06

## 2025-03-04 RX ORDER — NITROFURANTOIN MACROCRYSTALS 50 MG/1
CAPSULE ORAL
Qty: 30 CAPSULE | Refills: 2 | Status: SHIPPED | OUTPATIENT
Start: 2025-03-04

## 2025-03-05 ENCOUNTER — HOSPITAL ENCOUNTER (OUTPATIENT)
Dept: RADIOLOGY | Facility: CLINIC | Age: 55
Discharge: HOME | End: 2025-03-05
Payer: COMMERCIAL

## 2025-03-05 DIAGNOSIS — E11.9 TYPE 2 DIABETES MELLITUS WITHOUT COMPLICATION, WITHOUT LONG-TERM CURRENT USE OF INSULIN (MULTI): ICD-10-CM

## 2025-03-05 DIAGNOSIS — E78.2 HYPERLIPIDEMIA, MIXED: ICD-10-CM

## 2025-03-05 PROCEDURE — 75571 CT HRT W/O DYE W/CA TEST: CPT

## 2025-03-12 ENCOUNTER — APPOINTMENT (OUTPATIENT)
Dept: ENDOCRINOLOGY | Facility: CLINIC | Age: 55
End: 2025-03-12
Payer: COMMERCIAL

## 2025-04-03 ENCOUNTER — APPOINTMENT (OUTPATIENT)
Dept: UROLOGY | Facility: CLINIC | Age: 55
End: 2025-04-03
Payer: COMMERCIAL

## 2025-04-16 ENCOUNTER — APPOINTMENT (OUTPATIENT)
Dept: ENDOCRINOLOGY | Facility: CLINIC | Age: 55
End: 2025-04-16
Payer: COMMERCIAL

## 2025-05-22 PROBLEM — Z00.00 HEALTH MAINTENANCE EXAMINATION: Status: ACTIVE | Noted: 2025-05-22

## 2025-05-23 ENCOUNTER — APPOINTMENT (OUTPATIENT)
Dept: NUTRITION | Facility: CLINIC | Age: 55
End: 2025-05-23
Payer: COMMERCIAL

## 2025-06-03 ENCOUNTER — APPOINTMENT (OUTPATIENT)
Dept: PRIMARY CARE | Facility: CLINIC | Age: 55
End: 2025-06-03
Payer: COMMERCIAL

## 2025-06-03 VITALS
SYSTOLIC BLOOD PRESSURE: 150 MMHG | HEIGHT: 64 IN | WEIGHT: 204 LBS | HEART RATE: 90 BPM | BODY MASS INDEX: 34.83 KG/M2 | DIASTOLIC BLOOD PRESSURE: 99 MMHG

## 2025-06-03 DIAGNOSIS — M25.50 ARTHRALGIA OF MULTIPLE JOINTS: ICD-10-CM

## 2025-06-03 DIAGNOSIS — I10 HYPERTENSION, UNSPECIFIED TYPE: ICD-10-CM

## 2025-06-03 DIAGNOSIS — E11.9 TYPE 2 DIABETES MELLITUS WITHOUT COMPLICATION, WITHOUT LONG-TERM CURRENT USE OF INSULIN: ICD-10-CM

## 2025-06-03 DIAGNOSIS — G89.29 CHRONIC NONINTRACTABLE HEADACHE, UNSPECIFIED HEADACHE TYPE: ICD-10-CM

## 2025-06-03 DIAGNOSIS — K21.9 GASTROESOPHAGEAL REFLUX DISEASE, UNSPECIFIED WHETHER ESOPHAGITIS PRESENT: ICD-10-CM

## 2025-06-03 DIAGNOSIS — E11.9 CONTROLLED TYPE 2 DIABETES MELLITUS WITHOUT COMPLICATION, WITHOUT LONG-TERM CURRENT USE OF INSULIN: ICD-10-CM

## 2025-06-03 DIAGNOSIS — M62.838 MUSCLE SPASM: ICD-10-CM

## 2025-06-03 DIAGNOSIS — J30.0 VASOMOTOR RHINITIS: ICD-10-CM

## 2025-06-03 DIAGNOSIS — I10 ESSENTIAL HYPERTENSION: ICD-10-CM

## 2025-06-03 DIAGNOSIS — R51.9 CHRONIC NONINTRACTABLE HEADACHE, UNSPECIFIED HEADACHE TYPE: ICD-10-CM

## 2025-06-03 DIAGNOSIS — J30.89 NON-SEASONAL ALLERGIC RHINITIS DUE TO OTHER ALLERGIC TRIGGER: ICD-10-CM

## 2025-06-03 DIAGNOSIS — E66.01 CLASS 2 SEVERE OBESITY DUE TO EXCESS CALORIES WITH SERIOUS COMORBIDITY AND BODY MASS INDEX (BMI) OF 36.0 TO 36.9 IN ADULT: ICD-10-CM

## 2025-06-03 DIAGNOSIS — E66.812 CLASS 2 SEVERE OBESITY DUE TO EXCESS CALORIES WITH SERIOUS COMORBIDITY AND BODY MASS INDEX (BMI) OF 36.0 TO 36.9 IN ADULT: ICD-10-CM

## 2025-06-03 DIAGNOSIS — E78.2 HYPERLIPIDEMIA, MIXED: ICD-10-CM

## 2025-06-03 DIAGNOSIS — E55.9 VITAMIN D DEFICIENCY: Primary | ICD-10-CM

## 2025-06-03 PROCEDURE — 1036F TOBACCO NON-USER: CPT | Performed by: INTERNAL MEDICINE

## 2025-06-03 PROCEDURE — 99214 OFFICE O/P EST MOD 30 MIN: CPT | Performed by: INTERNAL MEDICINE

## 2025-06-03 PROCEDURE — 3077F SYST BP >= 140 MM HG: CPT | Performed by: INTERNAL MEDICINE

## 2025-06-03 PROCEDURE — 3008F BODY MASS INDEX DOCD: CPT | Performed by: INTERNAL MEDICINE

## 2025-06-03 PROCEDURE — 3080F DIAST BP >= 90 MM HG: CPT | Performed by: INTERNAL MEDICINE

## 2025-06-03 RX ORDER — PANTOPRAZOLE SODIUM 20 MG/1
20 TABLET, DELAYED RELEASE ORAL
Qty: 90 TABLET | Refills: 0 | Status: SHIPPED | OUTPATIENT
Start: 2025-06-03 | End: 2025-09-01

## 2025-06-03 RX ORDER — AZELASTINE 1 MG/ML
2 SPRAY, METERED NASAL NIGHTLY
Qty: 30 ML | Refills: 11 | Status: SHIPPED | OUTPATIENT
Start: 2025-06-03

## 2025-06-03 RX ORDER — FLUTICASONE PROPIONATE 50 MCG
2 SPRAY, SUSPENSION (ML) NASAL
Qty: 16 G | Refills: 11 | Status: SHIPPED | OUTPATIENT
Start: 2025-06-03 | End: 2026-06-03

## 2025-06-03 RX ORDER — LOSARTAN POTASSIUM AND HYDROCHLOROTHIAZIDE 12.5; 1 MG/1; MG/1
1 TABLET ORAL DAILY
Qty: 90 TABLET | Refills: 0 | Status: SHIPPED | OUTPATIENT
Start: 2025-06-03 | End: 2025-09-01

## 2025-06-03 RX ORDER — METFORMIN HYDROCHLORIDE 1000 MG/1
1000 TABLET ORAL
Qty: 180 TABLET | Refills: 0 | Status: SHIPPED | OUTPATIENT
Start: 2025-06-03 | End: 2025-09-01

## 2025-06-03 RX ORDER — AMLODIPINE BESYLATE 5 MG/1
5 TABLET ORAL DAILY
Qty: 90 TABLET | Refills: 0 | Status: SHIPPED | OUTPATIENT
Start: 2025-06-03

## 2025-06-03 RX ORDER — DULAGLUTIDE 4.5 MG/.5ML
4 INJECTION, SOLUTION SUBCUTANEOUS
Qty: 2 ML | Refills: 3 | Status: SHIPPED | OUTPATIENT
Start: 2025-06-03

## 2025-06-03 RX ORDER — METOPROLOL TARTRATE 25 MG/1
25 TABLET, FILM COATED ORAL 2 TIMES DAILY
Qty: 180 TABLET | Refills: 0 | Status: SHIPPED | OUTPATIENT
Start: 2025-06-03

## 2025-06-03 RX ORDER — CHOLECALCIFEROL (VITAMIN D3) 25 MCG
50 TABLET ORAL DAILY
Qty: 60 TABLET | Refills: 3 | Status: SHIPPED | OUTPATIENT
Start: 2025-06-03 | End: 2025-10-01

## 2025-06-03 RX ORDER — ATORVASTATIN CALCIUM 20 MG/1
20 TABLET, FILM COATED ORAL DAILY
Qty: 90 TABLET | Refills: 0 | Status: SHIPPED | OUTPATIENT
Start: 2025-06-03

## 2025-06-03 RX ORDER — DICLOFENAC SODIUM 10 MG/G
4 GEL TOPICAL 4 TIMES DAILY
Qty: 100 G | Refills: 1 | Status: SHIPPED | OUTPATIENT
Start: 2025-06-03

## 2025-06-03 ASSESSMENT — LIFESTYLE VARIABLES
HOW OFTEN DO YOU HAVE SIX OR MORE DRINKS ON ONE OCCASION: NEVER
HOW MANY STANDARD DRINKS CONTAINING ALCOHOL DO YOU HAVE ON A TYPICAL DAY: 1 OR 2
AUDIT-C TOTAL SCORE: 1
SKIP TO QUESTIONS 9-10: 1
HOW OFTEN DO YOU HAVE A DRINK CONTAINING ALCOHOL: MONTHLY OR LESS

## 2025-06-03 ASSESSMENT — ENCOUNTER SYMPTOMS
CHILLS: 0
ABDOMINAL PAIN: 0
VOMITING: 0
DIZZINESS: 0
BLOOD IN STOOL: 0
DIARRHEA: 0
PALPITATIONS: 0
NAUSEA: 0
COUGH: 0
MYALGIAS: 1
DIFFICULTY URINATING: 0
HEMATURIA: 0
SHORTNESS OF BREATH: 0
ARTHRALGIAS: 1
FEVER: 0
FATIGUE: 0

## 2025-06-03 ASSESSMENT — PATIENT HEALTH QUESTIONNAIRE - PHQ9
SUM OF ALL RESPONSES TO PHQ9 QUESTIONS 1 AND 2: 0
1. LITTLE INTEREST OR PLEASURE IN DOING THINGS: NOT AT ALL
2. FEELING DOWN, DEPRESSED OR HOPELESS: NOT AT ALL

## 2025-06-03 NOTE — PROGRESS NOTES
I reviewed the resident/fellow's documentation and discussed the patient with the resident/fellow. I agree with the resident/fellow's medical decision making as documented in the note.  As the attending physician,  I reviewed the relevant imaging studies and available reports. I also discussed the differential diagnosis and all of the proposed management plans with the resident.    Carri Lemons MD

## 2025-06-03 NOTE — PROGRESS NOTES
Subjective   Patient ID: Javier Rascon is a 55 y.o. female who presents for Follow-up.  Javier Rascon, a 54-year-old female with past medical history of hypertension, hyperlipidemia, diabetes mellitus type 2, recurrent UTIs, overactive bladder, rhinitis, and obesity presented to the clinic for a follow-up visit and refills.  Patient feels well overall denies any acute concerns or new alarming complaints.  No recent illness or hospitalization.  She did report arthralgias elbow and knee nonspecific.  She said it comes and goes.  Also reported some lower extremity muscle spasm.  No fever no chills no deformities noted.  No history of trauma or fall.  Denies any fever chills sweats chest pain shortness of breath palpitations nausea vomiting diarrhea blood in the stools urinary symptoms or leg swelling.    Review of Systems   Constitutional:  Negative for chills, fatigue and fever.   Respiratory:  Negative for cough and shortness of breath.    Cardiovascular:  Negative for chest pain, palpitations and leg swelling.   Gastrointestinal:  Negative for abdominal pain, blood in stool, diarrhea, nausea and vomiting.   Genitourinary:  Negative for difficulty urinating and hematuria.   Musculoskeletal:  Positive for arthralgias and myalgias.   Neurological:  Negative for dizziness and syncope.       Objective   Physical Exam  HENT:      Head: Normocephalic and atraumatic.      Mouth/Throat:      Mouth: Mucous membranes are moist.   Cardiovascular:      Rate and Rhythm: Normal rate.   Pulmonary:      Effort: Pulmonary effort is normal. No respiratory distress.      Breath sounds: No wheezing.   Abdominal:      General: Bowel sounds are normal.      Palpations: Abdomen is soft.      Tenderness: There is no abdominal tenderness.   Musculoskeletal:         General: No swelling or deformity.   Skin:     General: Skin is warm.   Neurological:      Mental Status: She is alert and oriented to person, place, and time.   Psychiatric:          Mood and Affect: Mood normal.         Behavior: Behavior normal.         Thought Content: Thought content normal.         Judgment: Judgment normal.         Assessment/Plan   Problem List Items Addressed This Visit       Class 2 severe obesity with serious comorbidity and body mass index (BMI) of 36.0 to 36.9 in adult    Allergic rhinitis    Diabetes (Multi)    Essential hypertension    Headache    Hyperlipidemia, mixed    Vasomotor rhinitis    Gastroesophageal reflux disease     Other Visit Diagnoses         Hypertension, unspecified type          Controlled type 2 diabetes mellitus without complication, without long-term current use of insulin                      Javier Rascon, a 54-year-old female with past medical history of hypertension, hyperlipidemia, diabetes mellitus type 2, recurrent UTIs, overactive bladder, rhinitis, and obesity presented to the clinic for a follow-up visit and refills.  Patient feels well overall denies any acute concerns or new alarming complaints.  No recent illness or hospitalization.  She did report arthralgias elbow and knee nonspecific.  She said it comes and goes.  Also reported some lower extremity muscle spasm.  No fever no chills no deformities noted.  No history of trauma or fall.  Denies any fever chills sweats chest pain shortness of breath palpitations nausea vomiting diarrhea blood in the stools urinary symptoms or leg swelling.     Hypertension  -Advised to check blood pressure at home  -Discussed lifestyle modification including diet and exercise  -Continue amlodipine, metoprolol tartrate, and losartan-hydrochlorothiazide  -No significant protein leak concern back in December 2024  -CMP today     Hyperlipidemia  -Lipid panel in December 24lipid panel reviewed/ improved back in December 2024  -Continue statin therapy  -Repeat lipid panel next visit     Type 2 diabetes  -A1c 6.3 back in December 2024; significantly improved  -Repeat A1c today  -Continue metformin  and Trulicity 4.5 mg once a week  -Following with clinical pharmacy regarding her medication  -Discussed lifestyle modification including diet and exercise  -Discussed eye and foot exams yearly; placed referral today      Obesity  -Discussed lifestyle modification including diet and exercise  -Currently on Trulicity 4.5 mg once weekly  -Following with clinical pharmacy     Rhinitis  -Follows with allergy and immunology  -On Flonase and azelastine     OAB  -Follows with Urology      # Arthralgia  - Voltaren gel; nonspecific; involves elbow joint and knee joint unilateral   - No signs of deformity or trauma  - Physical therapy referral    # Myalgia  - Lower extremity; check electrolytes      HM   =Patient deferred immunization; discussed   Mammogram UTD repeat in January 2026   Colonoscopy UTD repeat in January 2033   Dexa not indicated   Follows with OBGYN     RTC in October/November

## 2025-06-04 LAB
ALBUMIN SERPL-MCNC: 4.8 G/DL (ref 3.6–5.1)
ALP SERPL-CCNC: 97 U/L (ref 37–153)
ALT SERPL-CCNC: 23 U/L (ref 6–29)
ANION GAP SERPL CALCULATED.4IONS-SCNC: 9 MMOL/L (CALC) (ref 7–17)
AST SERPL-CCNC: 21 U/L (ref 10–35)
BILIRUB SERPL-MCNC: 1.1 MG/DL (ref 0.2–1.2)
BUN SERPL-MCNC: 9 MG/DL (ref 7–25)
CALCIUM SERPL-MCNC: 9.8 MG/DL (ref 8.6–10.4)
CHLORIDE SERPL-SCNC: 101 MMOL/L (ref 98–110)
CO2 SERPL-SCNC: 30 MMOL/L (ref 20–32)
CREAT SERPL-MCNC: 0.68 MG/DL (ref 0.5–1.03)
EGFRCR SERPLBLD CKD-EPI 2021: 104 ML/MIN/1.73M2
EST. AVERAGE GLUCOSE BLD GHB EST-MCNC: 131 MG/DL
EST. AVERAGE GLUCOSE BLD GHB EST-SCNC: 7.3 MMOL/L
GLUCOSE SERPL-MCNC: 101 MG/DL (ref 65–99)
HBA1C MFR BLD: 6.2 %
MAGNESIUM SERPL-MCNC: 2.3 MG/DL (ref 1.5–2.5)
POTASSIUM SERPL-SCNC: 3.8 MMOL/L (ref 3.5–5.3)
PROT SERPL-MCNC: 7.8 G/DL (ref 6.1–8.1)
SODIUM SERPL-SCNC: 140 MMOL/L (ref 135–146)

## 2025-06-19 ENCOUNTER — APPOINTMENT (OUTPATIENT)
Dept: PHARMACY | Facility: HOSPITAL | Age: 55
End: 2025-06-19
Payer: COMMERCIAL

## 2025-06-19 DIAGNOSIS — R35.0 URINARY FREQUENCY: ICD-10-CM

## 2025-06-19 RX ORDER — VIBEGRON 75 MG/1
75 TABLET, FILM COATED ORAL DAILY
Qty: 30 TABLET | Refills: 11 | Status: SHIPPED | OUTPATIENT
Start: 2025-06-19

## 2025-06-19 RX ORDER — ESTRADIOL 0.1 MG/G
CREAM VAGINAL
COMMUNITY

## 2025-06-19 NOTE — PROGRESS NOTES
Patient ID: Javier Rascon is a 55 y.o. female who presents for Overactive bladder.    Referring Provider: Stacey Vazquez   Pt was referred for cost assistance     Preferred Pharmacy:    [facility]/pharmacy #[id number] - [place], [place] - [address] [facility]  [address] [facility] [address] [address]  Phone: [phone] Fax: [phone]    [facility] - [place] - [facility] AT Portal to [facility]  [facility]  [place] [address]  Phone: [phone] Fax: [phone]    [id number] - [place], [place] - [address]  [address] [facility] [address] [facility] [address]  Phone: [phone] Fax: [phone]    [facility]  [address] [facility] [address]  [facility] [address]  Phone: [phone] Fax: [phone]    [facility] [address]  [address] [facility] [address]  Phone: [phone] Fax: [phone]    [facility]  [address] [address]  [facility] [address]  Phone: [phone] Fax: [phone]    [facility] [address]  [address] [facility] [address]  Phone: [phone] Fax: [phone]    [facility] [place], [place] - [address]  [facility] [address]  Phone: [phone] Fax: [phone]    [facility] [address]  [address] [facility] [address]  Phone: [phone] Fax: [phone]      Copay assistance:   Do you have copays on your medications? Yes  Do you have trouble affording your current medications? Yes     Patient Assistance Screening (VAF)    Patient verbally reports monthly or yearly income which is less than 400% federal poverty level   Application for program has been submitted for the following medications:   [place]   Patient has been  informed that program team will be reaching out to them to discuss necessary documentation, instructed to answer phone/return voicemail.   Patient aware this process may take up to 6 weeks.   If approved medication must be filled through [facility] pharmacy and may be picked up or mailed to patient.       Subjective      Vaginal Symptoms  Vaginal Dryness: no   Dysuria (pain, burning, stinging, or itching with urination): no   Vaginal and/or vulvar irritation/itching: No   Recurrent urinary tract infections: Yes, last UTI about two months ago   Lab Results   Component Value Date    ESTRADIOLFRE 0.30 11/10/2021    PROGESTERONE <0.3 11/10/2021    ESTRADIOL 12 11/10/2021    FSH 40.5 01/02/2024     Current Treatment:  Estradiol vaginal cream 3x/week     Urinary Symptoms  Medications that may contribute to symptoms:   Losartan/hydrochlorothiazide - dicussed importance of taking in morning; patient confirmed taking in morning  Amlodipine - tolerating without issue   Any history of:   Narrow-angle glaucoma? No  Impaired gastric emptying? No   Urinary retention? No    If diabetes, blood sugar controlled? HbA1c 7.7%  Frequently of bowel movement? Daily   Tobacco use? No   How many protective undergarments are you utilizing daily? No     What medications have been tried/stopped?   Solifenacin - stopped when switched to Gemtesa for better results   Current medication? None - ran out of Gemtesa months ago (no issues with medication - just fell off with the refills)  Previously was doing well with it and wants to resume    Cardiovascular Health  The 10-year ASCVD risk score (Madalyn ENGEL, et al., 2019) is: 14.9%    Values used to calculate the score:      Age: 55 years      Sex: Female      Is Non- : Yes      Diabetic: Yes      Tobacco smoker: No      Systolic Blood Pressure: 150 mmHg      Is BP treated: Yes      HDL Cholesterol: 46.4 mg/dL      Total Cholesterol: 136 mg/dL    Lab Results   Component Value  "Date    CHOL 136 12/04/2024     Lab Results   Component Value Date    HDL 46.4 12/04/2024     Lab Results   Component Value Date    LDLCALC 76 12/04/2024     Lab Results   Component Value Date    TRIG 69 12/04/2024     No components found for: \"CHOLHDL\"        Blood Sugar Balance  Lab Results   Component Value Date    GLUCOSE 101 (H) 06/03/2025    HGBA1C 6.2 (H) 06/03/2025    HGBA1C 6.3 (H) 12/04/2024    HGBA1C 7.7 (H) 07/30/2024     No results found for: \"LEPTIN\", \"INSULFAST\", \"GLUF\"      Thyroid  Lab Results   Component Value Date    TSH 1.05 12/04/2024       Iron Status  Lab Results   Component Value Date    IRON 47 10/11/2019    TIBC 344 10/11/2019    FERRITIN 72 10/11/2019        Kidney Function  Lab Results   Component Value Date    GFRF >90 07/23/2023    CREATININE 0.68 06/03/2025       Potassium  Lab Results   Component Value Date    K 3.8 06/03/2025        Vitamin D3  Lab Results   Component Value Date    VITD25 78 12/04/2024       Current Outpatient Medications on File Prior to Visit   Medication Sig Dispense Refill    amLODIPine (Norvasc) 5 mg tablet Take 1 tablet (5 mg) by mouth once daily. 90 tablet 0    atorvastatin (Lipitor) 20 mg tablet Take 1 tablet (20 mg) by mouth once daily. 90 tablet 0    azelastine (Astelin) 137 mcg (0.1 %) nasal spray Administer 2 sprays into each nostril once daily at bedtime. 30 mL 11    cholecalciferol (Vitamin D-3) 25 mcg (1,000 units) tablet Take 2 tablets (50 mcg) by mouth once daily. 60 tablet 3    diclofenac sodium (Voltaren) 1 % gel Apply 4.5 inches (4 g) topically 4 times a day. 100 g 1    dulaglutide (Trulicity) 4.5 mg/0.5 mL pen injector Inject 4 mg under the skin 1 (one) time per week. 2 mL 3    estradiol (Vagifem) 10 mcg tablet vaginal tablet Insert 1 tablet (10 mcg) into the vagina 2 times a week. 24 tablet 3    fluticasone (Flonase) 50 mcg/actuation nasal spray Administer 2 sprays into each nostril once daily with breakfast. Shake gently. Before first use, " prime pump. After use, clean tip and replace cap. 16 g 11    levocetirizine (Xyzal) 5 mg tablet TAKE 1 TABLET (5 MG) BY MOUTH DAILY IN THE EVENING 90 tablet 0    losartan-hydrochlorothiazide (Hyzaar) 100-12.5 mg tablet Take 1 tablet by mouth once daily. 90 tablet 0    metFORMIN (Glucophage) 1,000 mg tablet Take 1 tablet (1,000 mg) by mouth 2 times daily (morning and late afternoon). 180 tablet 0    metoprolol tartrate (Lopressor) 25 mg tablet Take 1 tablet (25 mg) by mouth 2 times a day. 180 tablet 0    nitrofurantoin (Macrodantin) 50 mg capsule One capsule by mouth as needed with intercourse for UTI prevention 30 capsule 2    pantoprazole (ProtoNix) 20 mg EC tablet Take 1 tablet (20 mg) by mouth once daily in the morning. Take before meals. As needed 90 tablet 0    vibegron (Gemtesa) 75 mg tablet Take 1 tablet (75 mg) by mouth once daily. Do not fill before December 17, 2024. 90 tablet 3     No current facility-administered medications on file prior to visit.        Medication and allergy reconciliation completed     Drug Interactions   No significant drug interactions identified    Assessment/Plan     Patient is experiencing urinary frequency. Since starting the Gemtesa, her frequency has decreased. Gemtesa does require PA from insurance, which was just approved. Given insurance is going to cover medication, no PAP is needed. Will switch Gemtesa to patient's local pharmacy so she can  with rest of medications to help her remember/not fall off with refills.   Has tried before solifenacin   Patient does not need to apply [facility]    Gemtesa   Discussed MOA: works by activating beta-3 adrenergic receptors in the bladder resulting in relaxation of the detrusor smooth muscle during the urine storage phase, thus increasing bladder capacity.  Provided education on administration and potential side effects including but not limited to hot flashes <2%, constipation/diarrhea <2%, dry mouth <2%, and headache <4%.    Gemtesa (vibegron) starts working almost immediately - within a few days of first taking it, with noticeable improvements in urinary urgency, frequency, and incontinence noted in clinical trials at 2 weeks which were reported as significant by 12 weeks.    LABS  Lab Results   Component Value Date    GFRF >90 07/23/2023     GFR >90 ml/min as of 6/3/2025    RESUME  Gemtesa 75 mg once daily    Follow-up: 1/8/26 at 10 am      Time spent with pt: Total length of time 15 (minutes) of the encounter and more than 50% was spent counseling the patient.      Sharon Odell, PharmD      Continue all meds under the continuation of care with the referring provider and clinical pharmacy team.    Verbal consent to manage patient's drug therapy was obtained from the patient and/or an individual authorized to act on behalf of a patient. They were informed they may decline to participate or withdraw from participation in pharmacy services at any time.

## 2025-06-20 DIAGNOSIS — N39.0 RECURRENT UTI: ICD-10-CM

## 2025-06-20 RX ORDER — NITROFURANTOIN MACROCRYSTALS 50 MG/1
CAPSULE ORAL
Qty: 30 CAPSULE | Refills: 2 | Status: SHIPPED | OUTPATIENT
Start: 2025-06-20

## 2025-07-18 DIAGNOSIS — G47.33 OBSTRUCTIVE SLEEP APNEA: Primary | ICD-10-CM

## 2025-07-25 ENCOUNTER — APPOINTMENT (OUTPATIENT)
Dept: PODIATRY | Facility: CLINIC | Age: 55
End: 2025-07-25
Payer: COMMERCIAL

## 2025-08-14 ENCOUNTER — TELEMEDICINE (OUTPATIENT)
Dept: PRIMARY CARE | Facility: CLINIC | Age: 55
End: 2025-08-14
Payer: COMMERCIAL

## 2025-08-14 ENCOUNTER — APPOINTMENT (OUTPATIENT)
Dept: UROLOGY | Facility: CLINIC | Age: 55
End: 2025-08-14
Payer: COMMERCIAL

## 2025-08-14 DIAGNOSIS — G47.33 OBSTRUCTIVE SLEEP APNEA: Primary | ICD-10-CM

## 2025-08-14 PROBLEM — K59.09 CHRONIC CONSTIPATION: Status: RESOLVED | Noted: 2023-03-23 | Resolved: 2025-08-14

## 2025-08-14 PROBLEM — E66.812 CLASS 2 SEVERE OBESITY WITH SERIOUS COMORBIDITY AND BODY MASS INDEX (BMI) OF 36.0 TO 36.9 IN ADULT: Status: RESOLVED | Noted: 2023-03-23 | Resolved: 2025-08-14

## 2025-08-14 PROBLEM — R63.5 WEIGHT GAIN: Status: RESOLVED | Noted: 2023-03-23 | Resolved: 2025-08-14

## 2025-08-14 PROBLEM — R51.9 HEADACHE: Status: RESOLVED | Noted: 2023-03-23 | Resolved: 2025-08-14

## 2025-08-14 PROBLEM — R42 DIZZINESS: Status: RESOLVED | Noted: 2023-03-23 | Resolved: 2025-08-14

## 2025-08-14 PROBLEM — T83.32XA IUD THREADS LOST: Status: RESOLVED | Noted: 2023-03-23 | Resolved: 2025-08-14

## 2025-08-14 PROBLEM — E66.01 CLASS 2 SEVERE OBESITY WITH SERIOUS COMORBIDITY AND BODY MASS INDEX (BMI) OF 36.0 TO 36.9 IN ADULT: Status: RESOLVED | Noted: 2023-03-23 | Resolved: 2025-08-14

## 2025-08-14 PROCEDURE — 99213 OFFICE O/P EST LOW 20 MIN: CPT | Performed by: INTERNAL MEDICINE

## 2025-08-30 DIAGNOSIS — E66.812 CLASS 2 SEVERE OBESITY DUE TO EXCESS CALORIES WITH SERIOUS COMORBIDITY AND BODY MASS INDEX (BMI) OF 36.0 TO 36.9 IN ADULT: ICD-10-CM

## 2025-08-30 DIAGNOSIS — K21.9 GASTROESOPHAGEAL REFLUX DISEASE, UNSPECIFIED WHETHER ESOPHAGITIS PRESENT: ICD-10-CM

## 2025-08-30 DIAGNOSIS — E11.9 TYPE 2 DIABETES MELLITUS WITHOUT COMPLICATION, WITHOUT LONG-TERM CURRENT USE OF INSULIN: ICD-10-CM

## 2025-08-30 DIAGNOSIS — I10 ESSENTIAL HYPERTENSION: ICD-10-CM

## 2025-08-30 DIAGNOSIS — I10 HYPERTENSION, UNSPECIFIED TYPE: ICD-10-CM

## 2025-08-30 DIAGNOSIS — E78.2 HYPERLIPIDEMIA, MIXED: ICD-10-CM

## 2025-08-30 DIAGNOSIS — E66.01 CLASS 2 SEVERE OBESITY DUE TO EXCESS CALORIES WITH SERIOUS COMORBIDITY AND BODY MASS INDEX (BMI) OF 36.0 TO 36.9 IN ADULT: ICD-10-CM

## 2025-09-01 RX ORDER — PANTOPRAZOLE SODIUM 20 MG/1
20 TABLET, DELAYED RELEASE ORAL
Qty: 90 TABLET | Refills: 0 | Status: SHIPPED | OUTPATIENT
Start: 2025-09-01 | End: 2025-11-30

## 2025-09-01 RX ORDER — METOPROLOL TARTRATE 25 MG/1
25 TABLET, FILM COATED ORAL 2 TIMES DAILY
Qty: 180 TABLET | Refills: 0 | Status: SHIPPED | OUTPATIENT
Start: 2025-09-01

## 2025-09-01 RX ORDER — METFORMIN HYDROCHLORIDE 1000 MG/1
1000 TABLET ORAL
Qty: 180 TABLET | Refills: 0 | Status: SHIPPED | OUTPATIENT
Start: 2025-09-01 | End: 2025-11-30

## 2025-09-01 RX ORDER — LOSARTAN POTASSIUM AND HYDROCHLOROTHIAZIDE 12.5; 1 MG/1; MG/1
1 TABLET ORAL DAILY
Qty: 90 TABLET | Refills: 0 | Status: SHIPPED | OUTPATIENT
Start: 2025-09-01

## 2025-09-05 ENCOUNTER — APPOINTMENT (OUTPATIENT)
Dept: PODIATRY | Facility: CLINIC | Age: 55
End: 2025-09-05
Payer: COMMERCIAL

## 2025-09-25 ENCOUNTER — APPOINTMENT (OUTPATIENT)
Dept: UROLOGY | Facility: CLINIC | Age: 55
End: 2025-09-25
Payer: COMMERCIAL

## 2025-10-03 ENCOUNTER — APPOINTMENT (OUTPATIENT)
Dept: PRIMARY CARE | Facility: CLINIC | Age: 55
End: 2025-10-03
Payer: COMMERCIAL

## 2025-10-29 ENCOUNTER — APPOINTMENT (OUTPATIENT)
Dept: PODIATRY | Facility: CLINIC | Age: 55
End: 2025-10-29
Payer: COMMERCIAL

## 2025-11-03 ENCOUNTER — APPOINTMENT (OUTPATIENT)
Dept: PRIMARY CARE | Facility: CLINIC | Age: 55
End: 2025-11-03
Payer: COMMERCIAL

## 2026-01-08 ENCOUNTER — APPOINTMENT (OUTPATIENT)
Dept: PHARMACY | Facility: HOSPITAL | Age: 56
End: 2026-01-08
Payer: COMMERCIAL